# Patient Record
Sex: FEMALE | Race: WHITE | Employment: OTHER | URBAN - METROPOLITAN AREA
[De-identification: names, ages, dates, MRNs, and addresses within clinical notes are randomized per-mention and may not be internally consistent; named-entity substitution may affect disease eponyms.]

---

## 2018-01-24 ENCOUNTER — HOSPITAL ENCOUNTER (OUTPATIENT)
Dept: CT IMAGING | Age: 58
Discharge: HOME | End: 2018-01-24
Attending: INTERNAL MEDICINE
Payer: COMMERCIAL

## 2018-01-24 DIAGNOSIS — R22.0 JAW SWELLING: ICD-10-CM

## 2018-01-24 LAB — CREAT UR-MCNC: 0.9 MG/DL (ref 0.6–1.3)

## 2018-01-24 PROCEDURE — 82565 ASSAY OF CREATININE: CPT

## 2018-01-24 PROCEDURE — 74011636320 HC RX REV CODE- 636/320

## 2018-01-24 PROCEDURE — 70491 CT SOFT TISSUE NECK W/DYE: CPT

## 2018-01-24 RX ADMIN — IOPAMIDOL 100 ML: 612 INJECTION, SOLUTION INTRAVENOUS at 15:00

## 2018-02-12 ENCOUNTER — HOSPITAL ENCOUNTER (OUTPATIENT)
Dept: MAMMOGRAPHY | Age: 58
Discharge: HOME | End: 2018-02-12
Attending: INTERNAL MEDICINE
Payer: COMMERCIAL

## 2018-02-12 DIAGNOSIS — Z12.31 VISIT FOR SCREENING MAMMOGRAM: ICD-10-CM

## 2018-02-12 PROCEDURE — 77067 SCR MAMMO BI INCL CAD: CPT

## 2018-08-10 ENCOUNTER — NURSE NAVIGATOR (OUTPATIENT)
Dept: OTHER | Age: 58
End: 2018-08-10

## 2018-08-10 NOTE — NURSE NAVIGATOR
Referring Provider: Misty Wary MD      Lung Cancer Risk Profile:   Age: 60  Gender: Female  Height: 66\"  Weight: 262#    Smoking History:  Smoking Status: current use  # years smokin  # years quit: 0  Packs/day: 1  Pack years: 37    Patient discussed smoking cessation with PCP: Yes, per patient report    Patient participated in shared decision making process with PCP: Unknown    Patient is currently experiencing symptoms: No, per patient report    If yes what symptoms:     Co-Morbidities:      Cancer History:      Additional Risk Factors:    Exposure to diesel fumes      Patient's smoking history discussed via phone. Patient meets LDCT lung cancer screening criteria.  Call transferred to central scheduling to schedule exam.      Santino Solis, KAELN, RN, [address] [facility] Nurse Navigator

## 2018-08-16 ENCOUNTER — HOSPITAL ENCOUNTER (OUTPATIENT)
Dept: CT IMAGING | Age: 58
Discharge: HOME | End: 2018-08-16
Attending: INTERNAL MEDICINE
Payer: COMMERCIAL

## 2018-08-16 VITALS — WEIGHT: 270 LBS | HEIGHT: 68 IN | BODY MASS INDEX: 40.92 KG/M2

## 2018-08-16 DIAGNOSIS — Z87.891 HISTORY OF TOBACCO USE: ICD-10-CM

## 2018-08-16 PROCEDURE — G0297 LDCT FOR LUNG CA SCREEN: HCPCS

## 2019-04-22 ENCOUNTER — HOSPITAL ENCOUNTER (OUTPATIENT)
Age: 59
Discharge: HOME | End: 2019-04-22
Attending: ORTHOPAEDIC SURGERY
Payer: COMMERCIAL

## 2019-04-22 DIAGNOSIS — M54.16 LUMBAR RADICULOPATHY: ICD-10-CM

## 2019-04-22 PROCEDURE — 72148 MRI LUMBAR SPINE W/O DYE: CPT

## 2020-01-08 ENCOUNTER — HOSPITAL ENCOUNTER (OUTPATIENT)
Dept: PHYSICAL THERAPY | Age: 60
Discharge: HOME | End: 2020-01-08
Payer: COMMERCIAL

## 2020-01-08 PROCEDURE — 97110 THERAPEUTIC EXERCISES: CPT

## 2020-01-08 PROCEDURE — 97162 PT EVAL MOD COMPLEX 30 MIN: CPT

## 2020-01-08 PROCEDURE — 97535 SELF CARE MNGMENT TRAINING: CPT

## 2020-01-08 NOTE — PROGRESS NOTES
[facility]  [address]  [place], [address]  [phone] fax    Plan of Care/ Statement of Necessity for Physical Therapy Services    Patient name: Moises Ordonez Start of Care: 2020   Referral source: Chaim Hankins MD : 1960    Medical Diagnosis: Unsteady gait [R26.81]  Low back pain [M54.5]  Payor: BLUE CROSS / Plan:  [facility] / Product Type: PPO /  Onset Date:2019    Treatment Diagnosis: LBP with B LE radiation   Prior Hospitalization: see medical history Provider#: [id number]   Medications: Verified on Patient summary List    Comorbidities: back pain, BMI over 30, high blood pressure, benign essential tremors with dystonia, incontinence (controlled)   Prior Level of Function: Independent prior to loss of function in 2019     The Plan of Care and following information is based on the information from the initial evaluation. Assessment/ key information: Pt is a 19-year-old woman with a referral for Aquatic therapy to address low back pain with radiculopathy and gait disturbances. She reports in early  experiencing back pain with radiation to her LE and saw a chiropractor for adjustments which helped for 2 weeks. In 2019, she reports suddenly being unable to walk and having numbness in her B LE, moreso on the left. She saw her doctor and received an MRI at that time. She has been in PT since 2019 with a recent discharge from [facility] last week. States that she received strain/conterstrain therapy to her neck, lumbar tractioning, chiropractics, and land-based therapy with good results. She had been w/c bound in April and is now Independent without an AD. Her c/c include low back pain with reference to her B LE, gait disturbances, and decreased standing and walking endurance.  She is restricted in her ADLs, including dishes and laundry, due to her lingering deficits and requires help from her . She is Independent with self care. She reports that since she started gabapentin, her neurological symptoms have decreased. She additionally has a history of benign essential tremors with dystonia, causing dystonic movements of her neck. Her walking endurance is currently limited to 5 minutes before she must sit down due to pain. Patient will continue to benefit from skilled PT services to modify and progress therapeutic interventions, address functional mobility deficits, address ROM deficits, address strength deficits, analyze and address soft tissue restrictions, analyze and cue movement patterns, analyze and modify body mechanics/ergonomics, assess and modify postural abnormalities, address imbalance/dizziness and instruct in home and community integration to attain remaining goals. Evaluation Complexity History HIGH Complexity :3+ comorbidities / personal factors will impact the outcome/ POC ; Examination HIGH Complexity : 4+ Standardized tests and measures addressing body structure, function, activity limitation and / or participation in recreation  ;Presentation MEDIUM Complexity : Evolving with changing characteristics  ; Clinical Decision Making MEDIUM Complexity : FOTO score of 26-74  Overall Complexity Rating: MEDIUM  Problem List: pain affecting function, decrease ROM, decrease strength, impaired gait/ balance, decrease ADL/ functional abilitiies, decrease activity tolerance and decrease flexibility/ joint mobility   Treatment Plan may include any combination of the following: Therapeutic exercise, Therapeutic activities, Neuromuscular re-education, Physical agent/modality, Gait/balance training, Manual therapy, Aquatic therapy, Patient education, Self Care training and Functional mobility training  Patient / Family readiness to learn indicated by: asking questions and interest  Persons(s) to be included in education: patient (P)  Barriers to Learning/Limitations: None  Patient Goal (s): no pain  Patient Self Reported Health Status: good  Rehabilitation Potential: good    Short Term Goals: To be accomplished in 2 weeks:   1. Pt will report compliance with her HEP 1-2 times daily to maximize therapeutic benefit. 2. Pt will complete 30-45 minute aquatic therapy session without increase in fatigue to improve ability to perform ADLs. Long Term Goals: To be accomplished in 6 weeks:   1. Pt will improve FOTO score to 48, demonstrating improved functional outcomes. 2. Pt will hold an abdominal crunch for at least 15 seconds, demonstrating improved core strength for activity endurance. 3. Pt will improve hip flexion and extension strength to 4/5 or better to improve walking endurance and capacity to perform ADLs. 4. Pt will walk for 10 minutes on the treadmill set to 0.8mph or greater, demonstrating improved ambulation tolerance. 5. Pt will indicate readiness to transition to land-based therapy upon reports of improved endurance with standing and ambulation for at least 10 minutes at home. 6. Pt will report decreased pain to 2/10 or better without lower extremity reference to improve QOL. Frequency / Duration: Patient to be seen 2 times per week for 6 weeks. Patient/ Caregiver education and instruction: Diagnosis, prognosis, self care, activity modification and exercises   [x]  Plan of care has been reviewed with GEORGETTE Becerra, PT 1/8/2020 1:46 PM    ________________________________________________________________________    I certify that the above Therapy Services are being furnished while the patient is under my care. I agree with the treatment plan and certify that this therapy is necessary.     [de-identified] Signature:____________Date:_________TIME:________    [employer], Date and Time must be completed for valid certification **    Please sign and return to [address] [address] [address]  [place], [place] [address] [phone] fax

## 2020-01-08 NOTE — PROGRESS NOTES
PT DAILY TREATMENT NOTE/LUMBAR EVAL 10-18    Patient Name: James Ahmadi  Date:2020  : 1960  [x]  Patient  Verified  Payor: BLUE CROSS / Plan: [employer] [facility] / Product Type: PPO /    In time:1151  Out time:109  Total Treatment Time (min): 78  Visit #: 1 of 12    Medicare/BCBS Only   Total Timed Codes (min):  45 1:1 Treatment Time:  78     Treatment Area: Unsteady gait [R26.81]  Low back pain [M54.5]  SUBJECTIVE  Pain Level (0-10 scale): 2  []constant [x]intermittent []improving []worsening []no change since onset    Any medication changes, allergies to medications, adverse drug reactions, diagnosis change, or new procedure performed?: [x] No    [] Yes (see summary sheet for update)  Subjective functional status/changes:     PLOF: In 2019, pt lost ability to walk due to consequences of lumbar radiculopathy and has been in PT since then. In April she was w/c dependent and has progressed back to no AD. Prior to April, she was independent  Limitations to PLOF: radiculopathy, pain  Mechanism of Injury: no injury  Current symptoms/Complaints: low back pain, B LE radiculopathy typically to posterior thighs and knees but occasionally to feet. Symptoms increase with prolonged standing and walking. Previous Treatment/Compliance: Pt received PT and chiropractics at [facility] where she progressed well with therapy  PMHx/Surgical Hx: neurological disease present (benign essential tremor with dystonia) affecting her head and neck  Work Hx: retired  Living Situation: lives in a single story home with her  with a couple steps to enter and 1-2 steps in the home to get to the living room and other areas of the home. Pt reports ability to do steps without assistance.   Pt Goals: walk better  Barriers: [x]pain []financial []time []transportation []other  Motivation: pt appears motivated and hopeful to keep progressing  Substance use: []Alcohol [x]Tobacco []other: OBJECTIVE/EXAMINATION  Domestic Life: lives with   Activity/Recreational Limitations: unable to do dishes, laundry, or house work due to pain and radicular symptoms. Cannot tolerate walking at stores without requiring rest following 5 minutes. Mobility: No device, endurance limited  Self Care: Independent, utilizes a shower chair due to limited standing tolerance    45 min [x]Eval                  []Re-Eval        20 min Therapeutic Exercise:  [] See flow sheet : instructed in HEP, provided manual stretching to piriformis B   Rationale: increase ROM and increase strength to improve the patients ability to perform self care and ADLs with improved ease and decreased pain. 13 min Self Care/Home Management:  []  See flow sheet : Instructed pt regarding rehab process and importance of doing HEP at home to supplement exercise. Rationale: increase ROM and increase strength  to improve the patients ability to perform ADLs and recreational activities. With   [x] TE   [] TA   [] neuro   [] other: Patient Education: [x] Review HEP    [] Progressed/Changed HEP based on:   [] positioning   [] body mechanics   [] transfers   [] heat/ice application    [] other:        Physical Therapy Evaluation - Lumbar Spine ([facility])    SUBJECTIVE  Chief Complaint: LBP, limited standing and walking endurance, wants to walk better    Mechanism of injury: neurological -- no injury    Symptoms:  Aggravated by:   [x] Bending [] Sitting [x] Standing [x] Walking   [x] Moving [] Cough [] Sneeze [] Valsalva   [] AM  [] PM  Lying:  [] sup   [] pro   [] sidelying   [] Other:     Eased by:    [] Bending [x] Sitting [] Standing [] Walking   [] Moving [] AM  [] PM  Lying: [] sup  [] pro  [] sidelying   [] Other:     General Health:  Red Flags Indicated?  [] Yes    [x] No  Pt asked to clarify incontinence she marked on the FOTO, in which she remarked that it is \"occasional urine leakage when I have to go to the bathroom due to my age. \" She states that this is not frequent and she controls it by going to the bathroom first.    Past History/Treatments: Received PT at [facility]    Diagnostic Tests: [] Lab work [] X-rays    [] CT [x] MRI     [] Other:  Results: Pt states results were L5-S2 segment compression    Functional Status  Prior level of function: Prior to 2019, Independent  Present functional limitations: Limited ADL capacity, limited ambulation and standing endurance    OBJECTIVE  Posture:  Lateral Shift: [] R    [] L     [] +  [] -  Kyphosis: [] Increased [] Decreased   []  WNL  Lordosis:  [] Increased [x] Decreased   [] WNL  Pelvic symmetry: [x] WNL    [] Other:    Gait:  [] Normal     [x] Abnormal: slow caryn, short step length, minimal foot strike bilaterally with the right foot contacting foot flat, minimal arm swing    Active Movements: [] N/A   [] Too acute   [] Other:  ROM % AROM % PROM Comments:pain, area   Forward flexion 40-60 75%   pain in low back   Extension 20-30 50%     SB right 20-30 50%     SB left 20-30 50%     Rotation right 5-10 50%     Rotation left 5-10 50%         Neuro Screen [x] WNL  Myotome/Dermatome  Comments: light touch sensation in tact, muscular weakness indicative of reduced activity as part of recovery    Dural Mobility:  SLR Sitting: [] R    [] L    [] +    [] -  @ (degrees):           Supine: [x] R    [x] L    [x] +    [] -  @ (degrees):  approx 60 degrees hip flexion B  Slump Test: [] R    [] L    [] +    [] -  @ (degrees):   Prone Knee Bend: [x] R    [x] L    [x] +    [] -  Positive with about 10 inches between heel and buttocks    Palpation  [] Min  [x] Mod  [] Severe    Location: L5 P-A mobilization pressure yields pain    Strength   L(0-5) R (0-5) N/T   Hip Flexion (L1,2) 3+ 3+ []   Knee Extension (L3,4) 4+ 4+ []   Ankle Dorsiflexion (L4) 4+ 4+ []   Great Toe Extension (L5)   [x]   Ankle Plantarflexion (S1) 4+ 4+ []   Knee Flexion (S1,2) 4+ 4+ []   Hip ABD/ADD 5 5 []   Hip IR/ER 4- 4- [] Paraspinals   [x]   Back Rotators   [x]   Gluteus Osmin 3+ 4- []   Other   []     Special Tests    [place]:  Gaenslen's: [] R    [] L    [] +    [] -     Compression: [] +    [x] -     Gapping:  [] +    [x] -     Thigh Thrust: [x] R    [x] L    [] +    [x] -     Leg Length: [] +    [x] -   Position:            Hip: Lito Mackintosh:  [x] R    [x] L    [x] +    [] -     Scour:  [x] R    [x] L    [] +    [x] -     Piriformis: [x] R    [x] L    [x] +    [] -          Deficits: Randall's: [] R    [] L    [] +    [] -     Tanner Fergusson: [] R    [] L    [] +    [] -     Hamstrings 90/90: limited flexibility to approx 60 degrees hip flex with neural tensioning       Global Muscular Weakness:  Abdominals: mini crunch endurance of 10 seconds  Glute Bridge endurance: 10 second hold      Other tests/comments:   Normal Stance EO/EC: 30 seconds   NBOS EO/EC: 30 seconds with additional postural sway due to benign essential tremors with dystonia    Pain Level (0-10 scale) post treatment: 2    ASSESSMENT/Changes in Function: Pt is a 79-year-old woman with a referral for Aquatic therapy to address low back pain with radiculopathy and gait disturbances. She reports in early 2019 experiencing back pain with radiation to her LE and saw a chiropractor for adjustments which helped for 2 weeks. In April of 2019, she reports suddenly being unable to walk and having numbness in her B LE, moreso on the left. She saw her doctor and received an MRI at that time. She has been in PT since April of 2019 with a recent discharge from [facility] last week. States that she received strain/conterstrain therapy to her neck, lumbar tractioning, chiropractics, and land-based therapy with good results. She had been w/c bound in April and is now Independent without an AD. Her c/c include low back pain with reference to her B LE, gait disturbances, and decreased standing and walking endurance.  She is restricted in her ADLs, including dishes and laundry, due to her lingering deficits and requires help from her . She is Independent with self care. She reports that since she started gabapentin, her neurological symptoms have decreased. She additionally has a history of benign essential tremors with dystonia, causing dystonic movements of her neck. Her walking endurance is currently limited to 5 minutes before she must sit down due to pain. Patient will continue to benefit from skilled PT services to modify and progress therapeutic interventions, address functional mobility deficits, address ROM deficits, address strength deficits, analyze and address soft tissue restrictions, analyze and cue movement patterns, analyze and modify body mechanics/ergonomics, assess and modify postural abnormalities, address imbalance/dizziness and instruct in home and community integration to attain remaining goals. [x]  See Plan of Care  []  See progress note/recertification  []  See Discharge Summary         Progress towards goals / Updated goals:  Short Term Goals: To be accomplished in 2 weeks:   1. Pt will report compliance with her HEP 1-2 times daily to maximize therapeutic benefit. IE: HEP assigned   2. Pt will complete 30-45 minute aquatic therapy session without increase in fatigue to improve ability to perform ADLs. IE: Pt reports fatigue following 5 minutes of standing/walking  Long Term Goals: To be accomplished in 6 weeks:   1. Pt will improve FOTO score to 48, demonstrating improved functional outcomes. IE: 39   2. Pt will hold an abdominal crunch for at least 15 seconds, demonstrating improved core strength for activity endurance. IE: 10 seconds with abdominal tremor   3. Pt will improve hip flexion and extension strength to 4/5 or better to improve walking endurance and capacity to perform ADLs. IE: hip flexion MMT B 3+/5, right hip extension MMT 4-/5 and left 3+/5   4.  Pt will walk for 10 minutes on the treadmill set to 0.8mph or greater, demonstrating improved ambulation tolerance. IE: d/c from previous facility, ambulates 5 minutes at 0.8mph before needs to rest   5. Pt will indicate readiness to transition to land-based therapy upon reports of improved endurance with standing and ambulation for at least 10 minutes at home. IE: back pain with radiculopathy limits land therapy progression   6. Pt will report decreased pain to 2/10 or better without lower extremity reference to improve QOL.    IE: pain ranges from 1-10/10      PLAN  []  Upgrade activities as tolerated     [x]  Continue plan of care  []  Update interventions per flow sheet       []  Discharge due to:_  []  Other:_      Raissa Betts, PT 1/8/2020  1:10 PM

## 2020-01-13 ENCOUNTER — HOSPITAL ENCOUNTER (OUTPATIENT)
Dept: PHYSICAL THERAPY | Age: 60
Discharge: HOME | End: 2020-01-13
Payer: COMMERCIAL

## 2020-01-13 PROCEDURE — 97113 AQUATIC THERAPY/EXERCISES: CPT

## 2020-01-13 NOTE — PROGRESS NOTES
PT DAILY TREATMENT NOTE 10-18    Patient Name: He Pierson  Date:2020  : 1960  [x]  Patient  Verified  Payor: BLUE CROSS / Plan: [employer] [facility] [place] / Product Type: PPO /    In time:515  Out time:609  Total Treatment Time (min): 54  Visit #: 2 of 12    Medicare/BCBS Only   Total Timed Codes (min):  54 1:1 Treatment Time:  54       Treatment Area: Unsteady gait [R26.81]  Low back pain [M54.5]    SUBJECTIVE  Pain Level (0-10 scale): 2  Any medication changes, allergies to medications, adverse drug reactions, diagnosis change, or new procedure performed?: [x] No    [] Yes (see summary sheet for update)  Subjective functional status/changes:   [] No changes reported  Pt reports exercises at home are going well. No c/o today. OBJECTIVE    54 min Therapeutic Exercise:  [x] See flow sheet : aquatic therapy   Rationale: increase ROM, increase strength and improve balance to improve the patients ability to perform self care with decreased pain and improved ease. With   [x] TE   [] TA   [] neuro   [] other: Patient Education: [x] Review HEP    [] Progressed/Changed HEP based on:   [] positioning   [] body mechanics   [] transfers   [] heat/ice application    [] other:      Pain Level (0-10 scale) post treatment: 3-4 back pain with fatigue, but \"feels good\"    ASSESSMENT/Changes in Function: Pt performs all exercises as directed. Utilized treadmill a second time at the conclusion of therapy to work towards her goal of continued endurance for gait outside of therapy.  Endurance is significantly improved in the pool as noted by pt and therapist.    Patient will continue to benefit from skilled PT services to modify and progress therapeutic interventions, address functional mobility deficits, address ROM deficits, address strength deficits, analyze and address soft tissue restrictions, analyze and cue movement patterns, analyze and modify body mechanics/ergonomics, assess and modify postural abnormalities, address imbalance/dizziness and instruct in home and community integration to attain remaining goals. [x]  See Plan of Care  []  See progress note/recertification  []  See Discharge Summary         Progress towards goals / Updated goals:  Short Term Goals: To be accomplished in 2 weeks:              1. Pt will report compliance with her HEP 1-2 times daily to maximize therapeutic benefit. IE: HEP assigned   Current: met pt reports doing them daily and that the stretches have assisted to decrease back pain. (1/13/2020)              2. Pt will complete 30-45 minute aquatic therapy session without increase in fatigue to improve ability to perform ADLs. IE: Pt reports fatigue following 5 minutes of standing/walking   Current: progressing-- Pt tolerates 54 minutes of therapy in pool, with 10 minutes of treadmill walking; feels fatigue following therapy (1/13/2020)    Long Term Goals: To be accomplished in 6 weeks:              1. Pt will improve FOTO score to 48, demonstrating improved functional outcomes. IE: 39              2. Pt will hold an abdominal crunch for at least 15 seconds, demonstrating improved core strength for activity endurance. IE: 10 seconds with abdominal tremor              3. Pt will improve hip flexion and extension strength to 4/5 or better to improve walking endurance and capacity to perform ADLs. IE: hip flexion MMT B 3+/5, right hip extension MMT 4-/5 and left 3+/5              4. Pt will walk for 10 minutes on the treadmill set to 0.8mph or greater, demonstrating improved ambulation tolerance. IE: d/c from previous facility, ambulates 5 minutes at 0.8mph before needs to rest              5. Pt will indicate readiness to transition to land-based therapy upon reports of improved endurance with standing and ambulation for at least 10 minutes at home.               IE: back pain with radiculopathy limits land therapy progression              6. Pt will report decreased pain to 2/10 or better without lower extremity reference to improve QOL.               IE: pain ranges from 1-10/10    PLAN  [x]  Upgrade activities as tolerated     [x]  Continue plan of care  []  Update interventions per flow sheet       []  Discharge due to:_  []  Other:_      Patricia Mendoza, PT 1/13/2020  5:25 PM    Future Appointments   Date Time Provider [place]on [place]   1/15/2020  5:15 PM Rangel Estrellita, PT [facility]   1/20/2020  5:15 PM Rangel Estrellita, PT [facility]   1/22/2020  5:15 PM Rangel Estrellita, PT [facility]   1/27/2020  5:15 PM Rangel Estrellita, PT [facility]   1/29/2020  5:15 PM Rangel Estrellita, PT [facility]   2/3/2020  3:45 PM Rangel Estrellita, PT [facility]   2/6/2020  3:45 PM Jenelle Douglas, PTA [facility]   2/11/2020  3:45 PM Jenelle Douglas, PTA [facility]   2/13/2020  3:45 PM Jenelle Douglas, PTA [facility]   2/18/2020  3:45 PM Jenelle Douglas, PTA [facility]   2/20/2020  3:45 PM Jenelle Douglas, PTA [facility]

## 2020-01-15 ENCOUNTER — HOSPITAL ENCOUNTER (OUTPATIENT)
Dept: PHYSICAL THERAPY | Age: 60
Discharge: HOME | End: 2020-01-15
Payer: COMMERCIAL

## 2020-01-15 PROCEDURE — 97113 AQUATIC THERAPY/EXERCISES: CPT

## 2020-01-15 NOTE — PROGRESS NOTES
PT DAILY TREATMENT NOTE 10-18    Patient Name: Jude Maxwell  Date:1/15/2020  : 1960  [x]  Patient  Verified  Payor: BLUE CROSS / Plan: [employer] [facility] / Product Type: PPO /    In time:518  Out time:601  Total Treatment Time (min): 43  Visit #: 3 of 12    Treatment Area: Unsteady gait [R26.81]  Low back pain [M54.5]    SUBJECTIVE  Pain Level (0-10 scale): 4  Any medication changes, allergies to medications, adverse drug reactions, diagnosis change, or new procedure performed?: [x] No    [] Yes (see summary sheet for update)  Subjective functional status/changes:   [] No changes reported  Pt reports LBP soreness and right LE soreness yesterday that has decreased some today. She also reports some nerve-related pain that would intermittently appear throughout the day. Today she is feeling better. OBJECTIVE    43 min Therapeutic Exercise:  [x] See flow sheet : aquatic therapy exercises initiated per flow sheet   Rationale: increase ROM, increase strength and improve balance to improve the patients ability to perform ADLs and self care with improved ease and decreased pain. With   [x] TE   [] TA   [] neuro   [] other: Patient Education: [x] Review HEP    [] Progressed/Changed HEP based on:   [] positioning   [] body mechanics   [] transfers   [] heat/ice application    [] other:      Pain Level (0-10 scale) post treatment: 3 in back, right hip pain \"better\"    ASSESSMENT/Changes in Function: Pt performs all exercises as directed with minimal cueing. Requires close supervision on the steps coming out of the pool today but negotiates them safely.     Patient will continue to benefit from skilled PT services to modify and progress therapeutic interventions, address functional mobility deficits, address ROM deficits, address strength deficits, analyze and address soft tissue restrictions, analyze and cue movement patterns, analyze and modify body mechanics/ergonomics, assess and modify postural abnormalities and instruct in home and community integration to attain remaining goals. [x]  See Plan of Care  []  See progress note/recertification  []  See Discharge Summary         Progress towards goals / Updated goals:  Short Term Goals: To be accomplished in 2 weeks:              5. Pt will report compliance with her HEP 1-2 times daily to maximize therapeutic benefit.              IE: HEP assigned              Current: met pt reports doing them daily and that the stretches have assisted to decrease back pain. (1/13/2020)                2. Pt will complete 30-45 minute aquatic therapy session without increase in fatigue to improve ability to perform ADLs.             XJ: Pt reports fatigue following 5 minutes of standing/walking              Current: progressing-- Pt tolerates 54 minutes of therapy in pool, with 10 minutes of treadmill walking; feels fatigue following therapy (1/13/2020)     Long Term Goals: To be accomplished in 6 weeks:              1. Pt will improve FOTO score to 48, demonstrating improved functional outcomes.             BZ: 21              4. Pt will hold an abdominal crunch for at least 15 seconds, demonstrating improved core strength for activity endurance.              IE: 10 seconds with abdominal tremor              3. Pt will improve hip flexion and extension strength to 4/5 or better to improve walking endurance and capacity to perform ADLs.             UJ: hip flexion MMT B 3+/5, right hip extension MMT 4-/5 and left 3+/5              4. Pt will walk for 10 minutes on the treadmill set to 0.8mph or greater, demonstrating improved ambulation tolerance.              IE: d/c from previous facility, ambulates 5 minutes at 0.8mph before needs to rest              5.  Pt will indicate readiness to transition to land-based therapy upon reports of improved endurance with standing and ambulation for at least 10 minutes at home.              IE: back pain with radiculopathy limits land therapy progression              6. Pt will report decreased pain to 2/10 or better without lower extremity reference to improve QOL.               LQ: pain ranges from 1-10/10    PLAN  []  Upgrade activities as tolerated     [x]  Continue plan of care  []  Update interventions per flow sheet       []  Discharge due to:_  []  Other:_      Sherrell Andrew, PT 1/15/2020  5:34 PM    Future Appointments   Date Time Provider Travon [place]   1/20/2020  5:15 PM Renu Kohut, PT [facility]   1/22/2020  5:15 PM Renu Kohut, PT [facility]   1/27/2020  5:15 PM Renu Kohut, PT [facility]   1/29/2020  5:15 PM Renu Kohut, PT [facility]   2/3/2020  3:45 PM Renu Kohut, PT [facility]   2/6/2020  3:45 PM [employer], PTA [facility]   2/11/2020  3:45 PM [employer], PTA [facility]   2/13/2020  3:45 PM [employer], PTA [facility]   2/18/2020  3:45 PM [employer], PTA [facility]   2/20/2020  3:45 PM [employer], PTA [facility]

## 2020-01-20 ENCOUNTER — APPOINTMENT (OUTPATIENT)
Dept: PHYSICAL THERAPY | Age: 60
End: 2020-01-20
Payer: COMMERCIAL

## 2020-01-22 ENCOUNTER — APPOINTMENT (OUTPATIENT)
Dept: PHYSICAL THERAPY | Age: 60
End: 2020-01-22
Payer: COMMERCIAL

## 2020-01-24 ENCOUNTER — NURSE NAVIGATOR (OUTPATIENT)
Dept: OTHER | Age: 60
End: 2020-01-24

## 2020-01-24 NOTE — NURSE NAVIGATOR
Referring Provider: Bella Goddard MD      Lung Cancer Risk Profile:   Age: 60  Gender: Female  Height: 66\"  Weight: 280#    Smoking History:  Smoking Status: current use  # years smokin  # years quit: 0  Packs/day: 1  Pack years: 40    Patient discussed smoking cessation with PCP: Yes, per provider note    Patient participated in shared decision making process with PCP: Unknown    Patient is currently experiencing symptoms: No, per patient report    If yes what symptoms:     Co-Morbidities:      Cancer History:      Additional Risk Factors:       Exposure to diesel fumes      Patient's smoking history discussed via phone. Patient meets LDCT lung cancer screening criteria.  Call transferred to central scheduling to schedule exam.      KAEL GoncalvesN, RN, [address] [address] [facility] Nurse Navigator

## 2020-01-27 ENCOUNTER — HOSPITAL ENCOUNTER (OUTPATIENT)
Dept: PHYSICAL THERAPY | Age: 60
Discharge: HOME | End: 2020-01-27
Payer: COMMERCIAL

## 2020-01-27 PROCEDURE — 97113 AQUATIC THERAPY/EXERCISES: CPT

## 2020-01-27 NOTE — PROGRESS NOTES
PT DAILY TREATMENT NOTE 10-18    Patient Name: Roya Arevalo  Date:2020  : 1960  [x]  Patient  Verified  Payor: BLUE CROSS / Plan: Chloe Jimenez / Product Type: PPO /    In time:512  Out time:600  Total Treatment Time (min): 48  Visit #: 4 of 12    Medicare/BCBS Only   Total Timed Codes (min):  48 1:1 Treatment Time:  48       Treatment Area: Unsteady gait [R26.81]  Low back pain [M54.5]    SUBJECTIVE  Pain Level (0-10 scale): 2  Any medication changes, allergies to medications, adverse drug reactions, diagnosis change, or new procedure performed?: [x] No    [] Yes (see summary sheet for update)  Subjective functional status/changes:   [] No changes reported  Pt reports pain is not bad today. States she had to cancel last week's appointments due to having the flu. Reports she has been trying to walk daily whether that is to her father's grave or around her house. OBJECTIVE    48 min Therapeutic Exercise:  [x] See flow sheet : Aquatic therapy initiated per flowsheet   Rationale: increase ROM and increase strength to improve the patients ability to perform ADLs and ambulatory tasks with improved ease and decreased pain. With   [x] TE   [] TA   [] neuro   [] other: Patient Education: [x] Review HEP    [] Progressed/Changed HEP based on:   [] positioning   [] body mechanics   [] transfers   [] heat/ice application    [] other:      Pain Level (0-10 scale) post treatment: 1    ASSESSMENT/Changes in Function: Pt performs exercises as directed, requiring verbal cues for correct form. Activity endurance continues to have limitations due to fatigue and onset of increasing back pain but has improved since her [facility] for 5 minutes of treadmill walking to 7 minutes. Pt educated to continue with HEP now that she is no longer sick.     Patient will continue to benefit from skilled PT services to modify and progress therapeutic interventions, address functional mobility deficits, address ROM deficits, address strength deficits, analyze and address soft tissue restrictions, analyze and cue movement patterns, analyze and modify body mechanics/ergonomics, assess and modify postural abnormalities, address imbalance/dizziness and instruct in home and community integration to attain remaining goals. [x]  See Plan of Care  []  See progress note/recertification  []  See Discharge Summary         Progress towards goals / Updated goals:  Short Term Goals: To be accomplished in 2 weeks:              2. Pt will report compliance with her HEP 1-2 times daily to maximize therapeutic benefit.              IE: HEP assigned              Current: met pt reports doing them daily and that the stretches have assisted to decrease back pain. (1/13/2020)                 2. Pt will complete 30-45 minute aquatic therapy session without increase in fatigue to improve ability to perform ADLs.             TV: Pt reports fatigue following 5 minutes of standing/walking              ZQCUAXY: met-- Pt tolerates 45 minute therapy session without increase in fatigue (1/27/2020)     Long Term Goals: To be accomplished in 6 weeks:              1. Pt will improve FOTO score to 48, demonstrating improved functional outcomes.             HX: 09              1. Pt will hold an abdominal crunch for at least 15 seconds, demonstrating improved core strength for activity endurance.              IE: 10 seconds with abdominal tremor              3. Pt will improve hip flexion and extension strength to 4/5 or better to improve walking endurance and capacity to perform ADLs.               PA: hip flexion MMT B 3+/5, right hip extension MMT 4-/5 and left 3+/5              4. Pt will walk for 10 minutes on the treadmill set to 0.8mph or greater, demonstrating improved ambulation tolerance.              IE: d/c from previous facility, ambulates 5 minutes at 0.8mph before needs to rest   Current: progressing -- pt ambulates on treadmill at [address] for 7 minutes before back pain requires her to rest (1/27/2020)              5. Pt will indicate readiness to transition to land-based therapy upon reports of improved endurance with standing and ambulation for at least 10 minutes at home.              IE: back pain with radiculopathy limits land therapy progression              6. Pt will report decreased pain to 2/10 or better without lower extremity reference to improve QOL.               WZ: pain ranges from 1-10/10   Current: progressing -- pt reports 2/10 pain with right LE reference (1/27/2020)    PLAN  []  Upgrade activities as tolerated     [x]  Continue plan of care  []  Update interventions per flow sheet       []  Discharge due to:_  []  Other:_      Janes Seen, PT 1/27/2020  5:15 PM    Future Appointments   Date Time Provider Travon [place]   1/29/2020  5:15 PM Rashi Specking, PT [facility]   1/30/2020 10:30 AM HBV CT RM 1 [facility]   2/3/2020  3:45 PM Rashi Specking, PT [facility]   2/6/2020  3:45 PM Christiano Hunting, PTA [facility]   2/11/2020  3:45 PM Christiano Hunting, PTA [facility]   2/13/2020  3:45 PM Christiano Hunting, PTA [facility]   2/18/2020  3:45 PM Christiano Hunting, PTA [facility]   2/20/2020  3:45 PM Christiano Hunting, PTA [facility]

## 2020-01-29 ENCOUNTER — HOSPITAL ENCOUNTER (OUTPATIENT)
Dept: PHYSICAL THERAPY | Age: 60
Discharge: HOME | End: 2020-01-29
Payer: COMMERCIAL

## 2020-01-29 PROCEDURE — 97113 AQUATIC THERAPY/EXERCISES: CPT

## 2020-01-29 NOTE — PROGRESS NOTES
PT DAILY TREATMENT NOTE 10-18    Patient Name: Devang Jules  Date:2020  : 1960  [x]  Patient  Verified  Payor: BLUE CROSS / Plan: [employer] [facility] / Product Type: PPO /    In time:512  Out time:600  Total Treatment Time (min): 48  Visit #: 5 of 12    Medicare/BCBS Only   Total Timed Codes (min):  48 1:1 Treatment Time:  48       Treatment Area: Unsteady gait [R26.81]  Low back pain [M54.5]    SUBJECTIVE  Pain Level (0-10 scale): 4  Any medication changes, allergies to medications, adverse drug reactions, diagnosis change, or new procedure performed?: [x] No    [] Yes (see summary sheet for update)  Subjective functional status/changes:   [] No changes reported  Pt reports feeling pain and soreness following her HEP today in her right hip and low back with numbness in her right posterior and lateral hip/thigh. She asks if she may need another chiropractic adjustment. OBJECTIVE    48 min Therapeutic Exercise:  [x] See flow sheet :Aquatic Therapy initiated per flowsheet   Rationale: increase ROM and increase strength to improve the patients ability to perform ADLs and ambulatory tasks with improved ease and decreased pain. With   [] TE   [] TA   [] neuro   [] other: Patient Education: [x] Review HEP    [] Progressed/Changed HEP based on:   [] positioning   [] body mechanics   [] transfers   [] heat/ice application    [] other:       Pain Level (0-10 scale) post treatment: 2    ASSESSMENT/Changes in Function: Pt performs exercises as directed with good results of decreased LBP and hip pain. Informed pt about post-rehab program at the [facility], [facility] and gave her an information sheet. Additionally recommended joining [facility] independently during therapy to promote increased aquatics exercises to assist with progression towards goals.     Patient will continue to benefit from skilled PT services to modify and progress therapeutic interventions, address functional mobility deficits, address ROM deficits, address strength deficits, analyze and address soft tissue restrictions, analyze and cue movement patterns, analyze and modify body mechanics/ergonomics, assess and modify postural abnormalities, address imbalance/dizziness and instruct in home and community integration to attain remaining goals. [x]  See Plan of Care  []  See progress note/recertification  []  See Discharge Summary         Progress towards goals / Updated goals:  Short Term Goals: To be accomplished in 2 weeks:              9. Pt will report compliance with her HEP 1-2 times daily to maximize therapeutic benefit.              IE: HEP assigned              Current: met pt reports doing them daily and that the stretches have assisted to decrease back pain. (1/13/2020)                 2. Pt will complete 30-45 minute aquatic therapy session without increase in fatigue to improve ability to perform ADLs.             TK: Pt reports fatigue following 5 minutes of standing/walking              GZGPZAQ: met-- Pt tolerates 45 minute therapy session without increase in fatigue (1/27/2020)     Long Term Goals: To be accomplished in 6 weeks:              1. Pt will improve FOTO score to 48, demonstrating improved functional outcomes.             OI: 13              0. Pt will hold an abdominal crunch for at least 15 seconds, demonstrating improved core strength for activity endurance.              IE: 10 seconds with abdominal tremor              3. Pt will improve hip flexion and extension strength to 4/5 or better to improve walking endurance and capacity to perform ADLs.               YD: hip flexion MMT B 3+/5, right hip extension MMT 4-/5 and left 3+/5              4. Pt will walk for 10 minutes on the treadmill set to 0.8mph or greater, demonstrating improved ambulation tolerance.              IE: d/c from previous facility, ambulates 5 minutes at 0.8mph before needs to rest              Current: progressing -- pt ambulates on treadmill at 1mph for 7 minutes before back pain requires her to rest (1/27/2020)              5. Pt will indicate readiness to transition to land-based therapy upon reports of improved endurance with standing and ambulation for at least 10 minutes at home.              IE: back pain with radiculopathy limits land therapy progression              6. Pt will report decreased pain to 2/10 or better without lower extremity reference to improve QOL.               GL: pain ranges from 1-10/10              Current: progressing -- pt reports 2/10 pain with right LE reference (1/27/2020)    PLAN  [x]  Upgrade activities as tolerated     [x]  Continue plan of care  []  Update interventions per flow sheet       []  Discharge due to:_  []  Other:_      Brandi Whitfield, PT 1/29/2020  5:32 PM    Future Appointments   Date Time Provider Travon [place]   1/30/2020 10:30 AM HBV CT RM 1 [facility]   2/3/2020  3:45 PM Gale Brooks, PT [facility]   2/6/2020  3:45 PM Piotr Nine, PTA [facility]   2/11/2020  3:45 PM Piotr Nine, PTA [facility]   2/13/2020  3:45 PM Piotr Nine, PTA [facility]   2/18/2020  3:45 PM Piotr Nine, PTA [facility]   2/20/2020  3:45 PM Piotr Nine, PTA [facility]

## 2020-01-30 ENCOUNTER — HOSPITAL ENCOUNTER (OUTPATIENT)
Dept: CT IMAGING | Age: 60
Discharge: HOME | End: 2020-01-30
Attending: INTERNAL MEDICINE
Payer: COMMERCIAL

## 2020-01-30 VITALS — HEIGHT: 66 IN | WEIGHT: 275 LBS | BODY MASS INDEX: 44.2 KG/M2

## 2020-01-30 DIAGNOSIS — F17.200 CURRENT SMOKER: ICD-10-CM

## 2020-01-30 PROCEDURE — G0297 LDCT FOR LUNG CA SCREEN: HCPCS

## 2020-02-03 ENCOUNTER — HOSPITAL ENCOUNTER (OUTPATIENT)
Dept: PHYSICAL THERAPY | Age: 60
Discharge: HOME | End: 2020-02-03
Payer: COMMERCIAL

## 2020-02-03 PROCEDURE — 97113 AQUATIC THERAPY/EXERCISES: CPT

## 2020-02-03 NOTE — PROGRESS NOTES
[facility]  [address]  [place], [address]  [phone] fax    Physical Therapy Progress Note  Patient name: Ozzie Morales Start of Care: 2020   Referral source: Kirill Benson MD : 1960   Medical/Treatment Diagnosis: Unsteady gait [R26.81]  Low back pain [M54.5]  Payor: Fernando Castaneda / Plan: [address] [facility] / Product Type: PPO /  Onset Date:2020     Prior Hospitalization: see medical history Provider#: [id number]   Medications: Verified on Patient Summary List    Comorbidities: back pain, BMI over 30, high blood pressure, benign essential tremors with dystonia, incontinence (controlled)  Prior Level of Function:Independent prior to loss of function in 2019  Visits from Start of Care: 6    Missed Visits: 2      Established Goals:          Short Term Goals: To be accomplished in 2 weeks:  1. Pt will report compliance with her HEP 1-2 times daily to maximize therapeutic benefit.              IE: HEP assigned              Current: met pt reports doing them daily and that the stretches have assisted to decrease back pain. 2. Pt will complete 30-45 minute aquatic therapy session without increase in fatigue to improve ability to perform ADLs.             SJ: Pt reports fatigue following 5 minutes of standing/walking              Current: met-- Pt tolerates 45 minute therapy session without increase in fatigue   Long Term Goals: To be accomplished in 6 weeks:  1. Pt will improve FOTO score to 48, demonstrating improved functional outcomes.             PU: 41   Current: progressing -- 44  2. Pt will hold an abdominal crunch for at least 15 seconds, demonstrating improved core strength for activity endurance.              IE: 10 seconds with abdominal tremor   Current: met -- pt holds crunch for 15 seconds with abdominal tremor  3.  Pt will improve hip flexion and extension strength to 4/5 or better to improve walking endurance and capacity to perform ADLs.             XF: hip flexion MMT B 3+/5, right hip extension MMT 4-/5 and left 3+/5   Current: met -- MMT hip flexion right 4/5 left 4+/5, hip extension right 4/5 left 5/5  4. Pt will walk for 10 minutes on the treadmill set to 0.8mph or greater, demonstrating improved ambulation tolerance.              IE: d/c from previous facility, ambulates 5 minutes at 0.8mph before needs to rest              Current: progressing -- pt ambulates on treadmill at 1mph for 8 minutes before back pain requires her to rest   5. Pt will indicate readiness to transition to land-based therapy upon reports of improved endurance with standing and ambulation for at least 10 minutes at home.              IE: back pain with radiculopathy limits land therapy progression   Current: progressing -- pt reports improved endurance at home but will still need sit breaks after a few minutes of walking   6. Pt will report decreased pain to 2/10 or better without lower extremity reference to improve QOL.             JK: pain ranges from 1-10/10              Current: progressing -- pt reports 2/10 pain with right LE reference     Key Functional Changes: improved strength, flexibility, and activity endurance    Updated Goals: to be achieved in 5 weeks:  1. Pt will improve FOTO score to 48, demonstrating improved functional outcomes. PN: progressing -- 44 (2/3/2020)  2. Pt will improve hip flexion and extension strength to 4+/5 or better to improve walking endurance and capacity to perform ADLs. PN: progressing -- MMT hip flexion right 4/5 left 4+/5, hip extension right 4/5 left 5/5 (2/3/2020)  3. Pt will walk for 10 minutes on the treadmill set to 0.8mph or greater, demonstrating improved ambulation tolerance.              PN: progressing -- pt ambulates on treadmill at 1mph for 8 minutes before back pain requires her to rest (2/3/2020)  4.  Pt will indicate readiness to transition to land-based therapy upon reports of improved endurance with standing and ambulation for at least 10 minutes at home. PN: progressing -- pt reports improved endurance at home but will still need sit breaks after a few minutes of walking (2/3/2020)  5. Pt will report decreased pain to 2/10 or better without lower extremity reference to improve QOL.             SA: progressing -- pt reports 2/10 pain with right LE reference (2/3/2020)      ASSESSMENT/RECOMMENDATIONS:  Pt performs exercises without increase in pain today. Pt has been progressively tolerating increased time on the treadmill, now able to ambulate for 8 minutes at 1mph before nerve-related right hip pain restricts her. Exercises continue to be challenging but she is able to complete 30-60 minutes of exercises per session. A laminated aquatic HEP has been provided for her to utilize in the future whenever she decides to transition to a post-rehab program at the [facility]. Patient will continue to benefit from skilled PT services to modify and progress therapeutic interventions, address functional mobility deficits, address ROM deficits, address strength deficits, analyze and address soft tissue restrictions, analyze and cue movement patterns, analyze and modify body mechanics/ergonomics, assess and modify postural abnormalities, address imbalance/dizziness and instruct in home and community integration to attain remaining goals.     [x]Continue therapy per initial plan/protocol at a frequency of  2 x per week for 5 weeks  []Continue therapy with the following recommended changes:_____________________      _____________________________________________________________________  []Discontinue therapy progressing towards or have reached established goals  []Discontinue therapy due to lack of appreciable progress towards goals  []Discontinue therapy due to lack of attendance or compliance  []Await Physician's recommendations/decisions regarding therapy  []Other:________________________________________________________________    Thank you for this referral.    Raissa Betts, PT 2/3/2020 3:30 PM  NOTE TO PHYSICIAN:  PLEASE COMPLETE THE ORDERS BELOW AND   FAX TO [facility]: [phone]  If you are unable to process this request in 24 hours please contact our office: [phone]    ? I have read the above report and request that my patient continue as recommended. ? I have read the above report and request that my patient continue therapy with the following changes/special instructions:__________________________________________________________  ? I have read the above report and request that my patient be discharged from therapy.     Physicians signature: ______________________________Date: ______Time:______

## 2020-02-03 NOTE — PROGRESS NOTES
PT DAILY TREATMENT NOTE 10-18    Patient Name: Dru Estrada  Date:2/3/2020  : 1960  [x]  Patient  Verified  Payor: BLUE CROSS / [facility] [place] / Product Type: PPO /    In time:343  Out time:436  Total Treatment Time (min): 53  Visit #: 6 of 12    Medicare/BCBS Only   Total Timed Codes (min):  53 1:1 Treatment Time:  53       Treatment Area: Unsteady gait [R26.81]  Low back pain [M54.5]    SUBJECTIVE  Pain Level (0-10 scale): 2-3 in back, 1-2 in right LE  Any medication changes, allergies to medications, adverse drug reactions, diagnosis change, or new procedure performed?: [x] No    [] Yes (see summary sheet for update)  Subjective functional status/changes:   [] No changes reported  Pt has good reports of decreased pain today. States she feels like she has greater energy following therapy sessions. Reports that she will see Dr Walker Silence assistant next Wednesday for a follow-up. OBJECTIVE    53 min Therapeutic Exercise:  [x] See flow sheet : Aquatic Therapy exercises initiated per flowsheet   Rationale: increase ROM and increase strength to improve the patients ability to perform self care and ADLs with improved ease and decreased pain. With   [x] TE   [] TA   [] neuro   [] other: Patient Education: [x] Review HEP    [] Progressed/Changed HEP based on:   [] positioning   [] body mechanics   [] transfers   [] heat/ice application    [] other:      Pain Level (0-10 scale) post treatment: 1-2 low back, 1 right leg    ASSESSMENT/Changes in Function: Pt performs exercises without increase in pain today. Pt has been progressively tolerating increased time on the treadmill, now able to ambulate for 8 minutes at 1mph before nerve-related right hip pain restricts her. Exercises continue to be challenging but she is able to complete 30-60 minutes of exercises per session.  A laminated aquatic HEP has been provided for her to utilize in the future whenever she decides to transition to a post-rehab program at the [facility]. Patient will continue to benefit from skilled PT services to modify and progress therapeutic interventions, address functional mobility deficits, address ROM deficits, address strength deficits, analyze and address soft tissue restrictions, analyze and cue movement patterns, analyze and modify body mechanics/ergonomics, assess and modify postural abnormalities, address imbalance/dizziness and instruct in home and community integration to attain remaining goals. []  See Plan of Care  [x]  See progress note/recertification  []  See Discharge Summary         Progress towards goals / Updated goals:  Short Term Goals: To be accomplished in 2 weeks:  1. Pt will report compliance with her HEP 1-2 times daily to maximize therapeutic benefit.              IE: HEP assigned              Current: met pt reports doing them daily and that the stretches have assisted to decrease back pain. 2. Pt will complete 30-45 minute aquatic therapy session without increase in fatigue to improve ability to perform ADLs.             GB: Pt reports fatigue following 5 minutes of standing/walking              Current: met-- Pt tolerates 45 minute therapy session without increase in fatigue   Long Term Goals: To be accomplished in 6 weeks:  1. Pt will improve FOTO score to 48, demonstrating improved functional outcomes.             RU: 41   Current: progressing -- 44  2. Pt will hold an abdominal crunch for at least 15 seconds, demonstrating improved core strength for activity endurance.              IE: 10 seconds with abdominal tremor   Current: met -- pt holds crunch for 15 seconds with abdominal tremor  3. Pt will improve hip flexion and extension strength to 4/5 or better to improve walking endurance and capacity to perform ADLs.               : hip flexion MMT B 3+/5, right hip extension MMT 4-/5 and left 3+/5   Current: met -- MMT hip flexion right 4/5 left 4+/5, hip extension right 4/5 left 5/5  4. Pt will walk for 10 minutes on the treadmill set to 0.8mph or greater, demonstrating improved ambulation tolerance.              IE: d/c from previous facility, ambulates 5 minutes at 0.8mph before needs to rest              Current: progressing -- pt ambulates on treadmill at 1mph for 8 minutes before back pain requires her to rest   5. Pt will indicate readiness to transition to land-based therapy upon reports of improved endurance with standing and ambulation for at least 10 minutes at home.              IE: back pain with radiculopathy limits land therapy progression   Current: progressing -- pt reports improved endurance at home but will still need sit breaks after a few minutes of walking   6. Pt will report decreased pain to 2/10 or better without lower extremity reference to improve QOL.               NB: pain ranges from 1-10/10              Current: progressing -- pt reports 2/10 pain with right LE reference   PLAN  [x]  Upgrade activities as tolerated     [x]  Continue plan of care  []  Update interventions per flow sheet       []  Discharge due to:_  []  Other:_      Brandi Whitfield, PT 2/3/2020  3:59 PM    Future Appointments   Date Time Provider [place]   2/6/2020  3:45 PM Piotr Nine, PTA [facility]   2/11/2020  3:45 PM Piotr Nine, PTA [facility]   2/13/2020  3:45 PM Piotr Nine, PTA [facility]   2/18/2020  3:45 PM Piotr Nine, PTA [facility]   2/20/2020  3:45 PM Piotr Nine, PTA [facility]

## 2020-02-06 ENCOUNTER — HOSPITAL ENCOUNTER (OUTPATIENT)
Dept: PHYSICAL THERAPY | Age: 60
Discharge: HOME | End: 2020-02-06
Payer: COMMERCIAL

## 2020-02-06 PROCEDURE — 97113 AQUATIC THERAPY/EXERCISES: CPT

## 2020-02-06 NOTE — PROGRESS NOTES
PT DAILY TREATMENT NOTE 10-18    Patient Name: Lambert Weber  Date:2020  : 1960  [x]  Patient  Verified  Payor: BLUE CROSS / Plan:  [facility] / Product Type: PPO /    In time:2:12  Out time:3:05  Total Treatment Time (min): 53  Visit #: 7 of 12    Medicare/BCBS Only   Total Timed Codes (min):  53 1:1 Treatment Time:  53       Treatment Area: Unsteady gait [R26.81]  Low back pain [M54.5]    SUBJECTIVE  Pain Level (0-10 scale): 3/10  Any medication changes, allergies to medications, adverse drug reactions, diagnosis change, or new procedure performed?: [x] No    [] Yes (see summary sheet for update)  Subjective functional status/changes:   [] No changes reported  Pt reports no new complaints of pain. Pt reports compliance with HEP. OBJECTIVE    53 min Therapeutic Exercise:  [x] See flow sheet :   Rationale: increase ROM and increase strength to improve the patients ability to tolerate ADLs. With   [] TE   [] TA   [] neuro   [] other: Patient Education: [x] Review HEP    [] Progressed/Changed HEP based on:   [] positioning   [] body mechanics   [] transfers   [] heat/ice application    [] other:      Other Objective/Functional Measures: minimal vc's for form with squats. Pain Level (0-10 scale) post treatment: 1/10    ASSESSMENT/Changes in Function: Pt has pain relief after performing aquatic exercises. Pt demonstrates improved endurance with ability to ambulate on TM in pool for 9 minutes at 1.1 MPH. Patient will continue to benefit from skilled PT services to modify and progress therapeutic interventions, address functional mobility deficits, address ROM deficits, address strength deficits, analyze and address soft tissue restrictions, analyze and cue movement patterns and analyze and modify body mechanics/ergonomics to attain remaining goals.      []  See Plan of Care  []  See progress note/recertification  []  See Discharge Summary         Progress towards goals / Updated goals:  Short Term Goals: To be accomplished in 2 weeks:  1. Pt will report compliance with her HEP 1-2 times daily to maximize therapeutic benefit.              IE: HEP assigned              Current: met pt reports doing them daily and that the stretches have assisted to decrease back pain. 2. Pt will complete 30-45 minute aquatic therapy session without increase in fatigue to improve ability to perform ADLs.             PZ: Pt reports fatigue following 5 minutes of standing/walking              Current: met-- Pt tolerates 45 minute therapy session without increase in fatigue   Long Term Goals: To be accomplished in 6 weeks:  1. Pt will improve FOTO score to 48, demonstrating improved functional outcomes.             RU: 41              Current: progressing -- 44  2. Pt will hold an abdominal crunch for at least 15 seconds, demonstrating improved core strength for activity endurance.              IE: 10 seconds with abdominal tremor              Current: met -- pt holds crunch for 15 seconds with abdominal tremor  3. Pt will improve hip flexion and extension strength to 4/5 or better to improve walking endurance and capacity to perform ADLs.             GN: hip flexion MMT B 3+/5, right hip extension MMT 4-/5 and left 3+/5              Current: met -- MMT hip flexion right 4/5 left 4+/5, hip extension right 4/5 left 5/5  4. Pt will walk for 10 minutes on the treadmill set to 0.8mph or greater, demonstrating improved ambulation tolerance.              IE: d/c from previous facility, ambulates 5 minutes at 0.8mph before needs to rest              Current: progressing -- pt ambulates on treadmill at 1mph for 8 minutes before back pain requires her to rest   5.  Pt will indicate readiness to transition to land-based therapy upon reports of improved endurance with standing and ambulation for at least 10 minutes at home.              IE: back pain with radiculopathy limits land therapy progression Current: progressing -- pt reports improved endurance at home but will still need sit breaks after a few minutes of walking   6. Pt will report decreased pain to 2/10 or better without lower extremity reference to improve QOL.               FI: pain ranges from 1-10/10              Current: progressing -- pt reports 2/10 pain with right LE reference     PLAN  []  Upgrade activities as tolerated     [x]  Continue plan of care  []  Update interventions per flow sheet       []  Discharge due to:_  []  Other:_      Latoya Rojas PTA 2/6/2020  3:12 PM    Future Appointments   Date Time Provider [place]on [place]   2/11/2020  3:45 PM Piotr Nine, PTA [facility]   2/13/2020  3:45 PM Piotr Nine, PTA [facility]   2/18/2020  3:45 PM Piotr Nine, PTA [facility]   2/20/2020  3:45 PM Piotr Nine, PTA [facility]

## 2020-02-11 ENCOUNTER — HOSPITAL ENCOUNTER (OUTPATIENT)
Dept: PHYSICAL THERAPY | Age: 60
Discharge: HOME | End: 2020-02-11
Payer: COMMERCIAL

## 2020-02-11 PROCEDURE — 97113 AQUATIC THERAPY/EXERCISES: CPT

## 2020-02-11 NOTE — PROGRESS NOTES
PT DAILY TREATMENT NOTE 10-18    Patient Name: Artie Rueda  Date:2020  : 1960  [x]  Patient  Verified  Payor: BLUE CROSS / Plan:  [facility] [place] / Product Type: PPO /    In time:3:45  Out time:4:31  Total Treatment Time (min): 46  Visit #: 2 of 10    Medicare/BCBS Only   Total Timed Codes (min):  46 1:1 Treatment Time:  46       Treatment Area: Unsteady gait [R26.81]  Low back pain [M54.5]    SUBJECTIVE  Pain Level (0-10 scale): 4/10  Any medication changes, allergies to medications, adverse drug reactions, diagnosis change, or new procedure performed?: [x] No    [] Yes (see summary sheet for update)  Subjective functional status/changes:   [] No changes reported  Pt reports she has some phantom pains in her right leg. OBJECTIVE    46 min Therapeutic Exercise:  [x] See flow sheet :   Rationale: increase ROM and increase strength to improve the patients ability to tolerate ADLs. With   [] TE   [] TA   [] neuro   [] other: Patient Education: [x] Review HEP    [] Progressed/Changed HEP based on:   [] positioning   [] body mechanics   [] transfers   [] heat/ice application    [] other:      Other Objective/Functional Measures: progressed TM walking to 9 minutes at 1.1 mph. Pain Level (0-10 scale) post treatment: 1/10    ASSESSMENT/Changes in Function: Pt has a reduction in symptoms after treatment. Pt continues to demonstrate improved functional mobility and endurance. Patient will continue to benefit from skilled PT services to modify and progress therapeutic interventions, address functional mobility deficits, address ROM deficits, address strength deficits and analyze and address soft tissue restrictions to attain remaining goals. []  See Plan of Care  []  See progress note/recertification  []  See Discharge Summary         Progress towards goals / Updated goals:  Short Term Goals: To be accomplished in 2 weeks:  1.  Pt will report compliance with her HEP 1-2 times daily to maximize therapeutic benefit.              IE: HEP assigned              Current: met pt reports doing them daily and that the stretches have assisted to decrease back pain. 2. Pt will complete 30-45 minute aquatic therapy session without increase in fatigue to improve ability to perform ADLs.             MG: Pt reports fatigue following 5 minutes of standing/walking              Current: met-- Pt tolerates 45 minute therapy session without increase in fatigue   Long Term Goals: To be accomplished in 6 weeks:  1. Pt will improve FOTO score to 48, demonstrating improved functional outcomes.             Z              OIBWYIP: progressing -- 44  2. Pt will hold an abdominal crunch for at least 15 seconds, demonstrating improved core strength for activity endurance.              IE: 10 seconds with abdominal tremor              Current: met -- pt holds crunch for 15 seconds with abdominal tremor  3. Pt will improve hip flexion and extension strength to 4/5 or better to improve walking endurance and capacity to perform ADLs.             YG: hip flexion MMT B 3+/5, right hip extension MMT 4-/5 and left 3+/5              Current: met -- MMT hip flexion right 4/5 left 4+/5, hip extension right 4/5 left 5/5  4. Pt will walk for 10 minutes on the treadmill set to 0.8mph or greater, demonstrating improved ambulation tolerance.              IE: d/c from previous facility, ambulates 5 minutes at 0.8mph before needs to rest              Current: progressing -- pt ambulates on treadmill at 1mph for 8 minutes before back pain requires her to rest   5.  Pt will indicate readiness to transition to land-based therapy upon reports of improved endurance with standing and ambulation for at least 10 minutes at home.              IE: back pain with radiculopathy limits land therapy progression              Current: progressing -- pt reports improved endurance at home but will still need sit breaks after a few minutes of walking   6. Pt will report decreased pain to 2/10 or better without lower extremity reference to improve QOL.               YQ: pain ranges from 1-10/10              Current: progressing -- pt reports 2/10 pain with right LE reference        PLAN  []  Upgrade activities as tolerated     [x]  Continue plan of care  []  Update interventions per flow sheet       []  Discharge due to:_  []  Other:_      Eliot Buenrostro, GEORGETTE 2/11/2020  3:41 PM    Future Appointments   Date Time Provider [place]   2/11/2020  3:45 PM Jenny Balls, PTA [facility]   2/13/2020  3:45 PM Jenny Balls, PTA [facility]   2/18/2020  3:45 PM Jenny Balls, PTA [facility]   2/20/2020  3:45 PM Jenny Balls, PTA [facility]

## 2020-02-13 ENCOUNTER — HOSPITAL ENCOUNTER (OUTPATIENT)
Dept: PHYSICAL THERAPY | Age: 60
Discharge: HOME | End: 2020-02-13
Payer: COMMERCIAL

## 2020-02-13 PROCEDURE — 97113 AQUATIC THERAPY/EXERCISES: CPT

## 2020-02-13 NOTE — PROGRESS NOTES
PT DAILY TREATMENT NOTE 10-18    Patient Name: Dru Estrada  Date:2020  : 1960  [x]  Patient  Verified  Payor: BLUE CROSS / Plan: [facility] [place] / Product Type: PPO /    In time:3:44  Out time:4:31  Total Treatment Time (min): 47  Visit #: 3 of 10    Medicare/BCBS Only   Total Timed Codes (min):  47 1:1 Treatment Time:  47       Treatment Area: Unsteady gait [R26.81]  Low back pain [M54.5]    SUBJECTIVE  Pain Level (0-10 scale): 4/10  Any medication changes, allergies to medications, adverse drug reactions, diagnosis change, or new procedure performed?: [x] No    [] Yes (see summary sheet for update)  Subjective functional status/changes:   [] No changes reported  Pt reports she had to go to a  yesterday and she was sitting on hard chairs for a while causing her to have increased pain today. OBJECTIVE    47 min Therapeutic Exercise:  [x] See flow sheet :   Rationale: increase ROM and increase strength to improve the patients ability to tolerate ADLs. With   [] TE   [] TA   [] neuro   [] other: Patient Education: [x] Review HEP    [] Progressed/Changed HEP based on:   [] positioning   [] body mechanics   [] transfers   [] heat/ice application    [] other:      Other Objective/Functional Measures: progressed side stepping to walking laterally on treadmill. Pain Level (0-10 scale) post treatment: 2/10    ASSESSMENT/Changes in Function: Pt demonstrates improved endurance with ability to perform 4 way treadmill for 15 minutes. Patient will continue to benefit from skilled PT services to modify and progress therapeutic interventions, address functional mobility deficits, address ROM deficits, address strength deficits, analyze and address soft tissue restrictions, analyze and cue movement patterns and analyze and modify body mechanics/ergonomics to attain remaining goals.      []  See Plan of Care  []  See progress note/recertification  []  See Discharge Summary Progress towards goals / Updated goals:  Short Term Goals: To be accomplished in 2 weeks:  1. Pt will report compliance with her HEP 1-2 times daily to maximize therapeutic benefit.              IE: HEP assigned              Current: met pt reports doing them daily and that the stretches have assisted to decrease back pain. 2. Pt will complete 30-45 minute aquatic therapy session without increase in fatigue to improve ability to perform ADLs.             EM: Pt reports fatigue following 5 minutes of standing/walking              Current: met-- Pt tolerates 45 minute therapy session without increase in fatigue   Long Term Goals: To be accomplished in 6 weeks:  1. Pt will improve FOTO score to 48, demonstrating improved functional outcomes.             FJ: 52              EXGVMEJ: progressing -- 44  2. Pt will hold an abdominal crunch for at least 15 seconds, demonstrating improved core strength for activity endurance.              IE: 10 seconds with abdominal tremor              Current: met -- pt holds crunch for 15 seconds with abdominal tremor  3. Pt will improve hip flexion and extension strength to 4/5 or better to improve walking endurance and capacity to perform ADLs.             LC: hip flexion MMT B 3+/5, right hip extension MMT 4-/5 and left 3+/5              Current: met -- MMT hip flexion right 4/5 left 4+/5, hip extension right 4/5 left 5/5  4. Pt will walk for 10 minutes on the treadmill set to 0.8mph or greater, demonstrating improved ambulation tolerance.              IE: d/c from previous facility, ambulates 5 minutes at 0.8mph before needs to rest              Current: progressing -- Pt ambulates at 1.1 mph for 9 minutes. 2/13/2020  5.  Pt will indicate readiness to transition to land-based therapy upon reports of improved endurance with standing and ambulation for at least 10 minutes at home.              IE: back pain with radiculopathy limits land therapy progression              Current: progressing -- pt reports improved endurance at home but will still need sit breaks after a few minutes of walking   6. Pt will report decreased pain to 2/10 or better without lower extremity reference to improve QOL.               IA: pain ranges from 1-10/10              Current: progressing -- pt reports 2/10 pain with right LE reference        PLAN  []  Upgrade activities as tolerated     [x]  Continue plan of care  []  Update interventions per flow sheet       []  Discharge due to:_  []  Other:_      Edgar Carmona PTA 2/13/2020  3:47 PM    Future Appointments   Date Time Provider [place]   2/18/2020  3:45 PM Pilar Margarito, PTA [facility]   2/20/2020  3:45 PM Pilar Margarito, PTA [facility]

## 2020-02-18 ENCOUNTER — HOSPITAL ENCOUNTER (OUTPATIENT)
Dept: PHYSICAL THERAPY | Age: 60
Discharge: HOME | End: 2020-02-18
Payer: COMMERCIAL

## 2020-02-18 PROCEDURE — 97113 AQUATIC THERAPY/EXERCISES: CPT

## 2020-02-18 NOTE — PROGRESS NOTES
PT DAILY TREATMENT NOTE 10-18    Patient Name: Ozzy Ignacio  Date:2020  : 1960  [x]  Patient  Verified  Payor: BLUE CROSS / Plan:  [facility] / Product Type: PPO /    In time:3:42  Out time:4:35  Total Treatment Time (min): 53  Visit #: 4 of 10    Medicare/BCBS Only   Total Timed Codes (min):  53 1:1 Treatment Time:  53       Treatment Area: Unsteady gait [R26.81]  Low back pain [M54.5]    SUBJECTIVE  Pain Level (0-10 scale): 3/10  Any medication changes, allergies to medications, adverse drug reactions, diagnosis change, or new procedure performed?: [x] No    [] Yes (see summary sheet for update)  Subjective functional status/changes:   [] No changes reported  Pt reports she has some pain in her hip. OBJECTIVE    53 min Therapeutic Exercise:  [x] See flow sheet :   Rationale: increase ROM and increase strength to improve the patients ability to tolerate ADLs. With   [] TE   [] TA   [] neuro   [] other: Patient Education: [x] Review HEP    [] Progressed/Changed HEP based on:   [] positioning   [] body mechanics   [] transfers   [] heat/ice application    [] other:      Other Objective/Functional Measures: Added side step ups. Pain Level (0-10 scale) post treatment: 0/10    ASSESSMENT/Changes in Function: Pt demonstrates improved functional mobility with no increased symptoms post treatment and no increased symptoms. Patient will continue to benefit from skilled PT services to modify and progress therapeutic interventions, address functional mobility deficits, address ROM deficits, address strength deficits, analyze and address soft tissue restrictions, analyze and cue movement patterns and analyze and modify body mechanics/ergonomics to attain remaining goals. []  See Plan of Care  []  See progress note/recertification  []  See Discharge Summary         Progress towards goals / Updated goals:  Short Term Goals: To be accomplished in 2 weeks:  1.  Pt will report compliance with her HEP 1-2 times daily to maximize therapeutic benefit.              IE: HEP assigned              Current: met pt reports doing them daily and that the stretches have assisted to decrease back pain. 2. Pt will complete 30-45 minute aquatic therapy session without increase in fatigue to improve ability to perform ADLs.             DD: Pt reports fatigue following 5 minutes of standing/walking              Current: met-- Pt tolerates 45 minute therapy session without increase in fatigue   Long Term Goals: To be accomplished in 6 weeks:  1. Pt will improve FOTO score to 48, demonstrating improved functional outcomes.             LL: 47              IYSRGJZ: progressing -- 44  2. Pt will hold an abdominal crunch for at least 15 seconds, demonstrating improved core strength for activity endurance.              IE: 10 seconds with abdominal tremor              Current: met -- pt holds crunch for 15 seconds with abdominal tremor  3. Pt will improve hip flexion and extension strength to 4/5 or better to improve walking endurance and capacity to perform ADLs.             RS: hip flexion MMT B 3+/5, right hip extension MMT 4-/5 and left 3+/5              Current: met -- MMT hip flexion right 4/5 left 4+/5, hip extension right 4/5 left 5/5  4. Pt will walk for 10 minutes on the treadmill set to 0.8mph or greater, demonstrating improved ambulation tolerance.              IE: d/c from previous facility, ambulates 5 minutes at 0.8mph before needs to rest              Current: progressing -- Pt ambulates at 1.1 mph for 9 minutes. 2/13/2020  5.  Pt will indicate readiness to transition to land-based therapy upon reports of improved endurance with standing and ambulation for at least 10 minutes at home.              IE: back pain with radiculopathy limits land therapy progression              Current: progressing -- pt reports improved endurance at home but will still need sit breaks after a few minutes of walking   6. Pt will report decreased pain to 2/10 or better without lower extremity reference to improve QOL.               NS: pain ranges from 1-10/10              Current: progressing -- pt reports 2/10 pain with right LE reference     PLAN   []  Upgrade activities as tolerated     [x]  Continue plan of care  []  Update interventions per flow sheet       []  Discharge due to:_  []  Other:_      Lydia Goins PTA 2/18/2020  3:38 PM    Future Appointments   Date Time Provider [place]   2/18/2020  3:45 PM Dougie Trotter PTA [facility]   2/20/2020  3:45 PM [facility]

## 2020-02-20 ENCOUNTER — HOSPITAL ENCOUNTER (OUTPATIENT)
Dept: PHYSICAL THERAPY | Age: 60
Discharge: HOME | End: 2020-02-20
Payer: COMMERCIAL

## 2020-02-20 PROCEDURE — 97113 AQUATIC THERAPY/EXERCISES: CPT

## 2020-02-20 NOTE — PROGRESS NOTES
PT DAILY TREATMENT NOTE 10-18    Patient Name: Roya Arevalo  Date:2020  : 1960  [x]  Patient  Verified  Payor: BLUE CROSS / Plan:  [facility] / Product Type: PPO /    In time:3:43  Out time:4:30  Total Treatment Time (min): 47  Visit #: 5 of 10    Medicare/BCBS Only   Total Timed Codes (min):  47 1:1 Treatment Time:  47       Treatment Area: Unsteady gait [R26.81]  Low back pain [M54.5]    SUBJECTIVE  Pain Level (0-10 scale): 3/10  Any medication changes, allergies to medications, adverse drug reactions, diagnosis change, or new procedure performed?: [x] No    [] Yes (see summary sheet for update)  Subjective functional status/changes:   [] No changes reported  Pt reports continued hip pain today. OBJECTIVE    47 min Therapeutic Exercise:  [x] See flow sheet :   Rationale: increase ROM and increase strength to improve the patients ability to tolerate ADLs. With   [] TE   [] TA   [] neuro   [] other: Patient Education: [x] Review HEP    [] Progressed/Changed HEP based on:   [] positioning   [] body mechanics   [] transfers   [] heat/ice application    [] other:      Other Objective/Functional Measures: progressed treadmill to x4 for 5 minutes each direction. Pain Level (0-10 scale) post treatment: 3/10    ASSESSMENT/Changes in Function: Pt demonstrates improved endurance when ambulating on treadmill. Patient will continue to benefit from skilled PT services to modify and progress therapeutic interventions, address functional mobility deficits, address ROM deficits, address strength deficits, analyze and address soft tissue restrictions, analyze and cue movement patterns, analyze and modify body mechanics/ergonomics and assess and modify postural abnormalities to attain remaining goals.      []  See Plan of Care  []  See progress note/recertification  []  See Discharge Summary         Progress towards goals / Updated goals:  Short Term Goals: To be accomplished in 2 weeks:  1. Pt will report compliance with her HEP 1-2 times daily to maximize therapeutic benefit.              IE: HEP assigned              Current: met pt reports doing them daily and that the stretches have assisted to decrease back pain. 2. Pt will complete 30-45 minute aquatic therapy session without increase in fatigue to improve ability to perform ADLs.             MS: Pt reports fatigue following 5 minutes of standing/walking              Current: met-- Pt tolerates 45 minute therapy session without increase in fatigue   Long Term Goals: To be accomplished in 6 weeks:  1. Pt will improve FOTO score to 48, demonstrating improved functional outcomes.             BH: 65              BNZENMW: progressing -- 44  2. Pt will hold an abdominal crunch for at least 15 seconds, demonstrating improved core strength for activity endurance.              IE: 10 seconds with abdominal tremor              Current: met -- pt holds crunch for 15 seconds with abdominal tremor  3. Pt will improve hip flexion and extension strength to 4/5 or better to improve walking endurance and capacity to perform ADLs.             HL: hip flexion MMT B 3+/5, right hip extension MMT 4-/5 and left 3+/5              Current: met -- MMT hip flexion right 4/5 left 4+/5, hip extension right 4/5 left 5/5  4. Pt will walk for 10 minutes on the treadmill set to 0.8mph or greater, demonstrating improved ambulation tolerance.              IE: d/c from previous facility, ambulates 5 minutes at 0.8mph before needs to rest              Current: progressing -- Pt ambulates at 1.1 mph for 9 minutes. 2/13/2020  5.  Pt will indicate readiness to transition to land-based therapy upon reports of improved endurance with standing and ambulation for at least 10 minutes at home.              IE: back pain with radiculopathy limits land therapy progression              Current: progressing -- pt reports improved endurance at home but will still need sit breaks after a few minutes of walking   6. Pt will report decreased pain to 2/10 or better without lower extremity reference to improve QOL.             NL: pain ranges from 1-10/10              Current: progressing -- pt reports 2/10 pain with right LE reference        PLAN  []  Upgrade activities as tolerated     [x]  Continue plan of care  []  Update interventions per flow sheet       []  Discharge due to:_  []  Other:_      Champ Harrington, PTA 2/20/2020  3:49 PM    No future appointments.

## 2020-02-25 ENCOUNTER — HOSPITAL ENCOUNTER (OUTPATIENT)
Dept: PHYSICAL THERAPY | Age: 60
Discharge: HOME | End: 2020-02-25
Payer: COMMERCIAL

## 2020-02-25 PROCEDURE — 97113 AQUATIC THERAPY/EXERCISES: CPT

## 2020-02-25 NOTE — PROGRESS NOTES
PT DAILY TREATMENT NOTE 10-18    Patient Name: Rosalva Goldberg  Date:2020  : 1960  [x]  Patient  Verified  Payor: BLUE CROSS / Plan:  [facility] / Product Type: PPO /    In time:3:40  Out time:4:30  Total Treatment Time (min): 50  Visit #: 6 of 10    Medicare/BCBS Only   Total Timed Codes (min):  50 1:1 Treatment Time:  50       Treatment Area: Unsteady gait [R26.81]  Low back pain [M54.5]    SUBJECTIVE  Pain Level (0-10 scale): 0/10  Any medication changes, allergies to medications, adverse drug reactions, diagnosis change, or new procedure performed?: [x] No    [] Yes (see summary sheet for update)  Subjective functional status/changes:   [] No changes reported  Pt reports she is able to do more social activities but feels that if she has to do stairs she would have a very difficult time. OBJECTIVE     50 min Therapeutic Exercise:  [x] See flow sheet :   Rationale: increase ROM and increase strength to improve the patients ability to tolerate ADLs. With   [] TE   [] TA   [] neuro   [] other: Patient Education: [x] Review HEP    [] Progressed/Changed HEP based on:   [] positioning   [] body mechanics   [] transfers   [] heat/ice application    [] other:      Other Objective/Functional Measures: progressed step ups to dynamic step ups. Pain Level (0-10 scale) post treatment: 0/10    ASSESSMENT/Changes in Function: Pt continues to demonstrate improved gait efficiency and endurance with decreased fatigue and improved gait mechanics when ambulating in community. Patient will continue to benefit from skilled PT services to modify and progress therapeutic interventions, address functional mobility deficits, address ROM deficits, address strength deficits, analyze and address soft tissue restrictions, analyze and cue movement patterns and analyze and modify body mechanics/ergonomics to attain remaining goals.      []  See Plan of Care  []  See progress note/recertification  []  See Discharge Summary         Progress towards goals / Updated goals:  Short Term Goals: To be accomplished in 2 weeks:  1. Pt will report compliance with her HEP 1-2 times daily to maximize therapeutic benefit.              IE: HEP assigned              Current: met pt reports doing them daily and that the stretches have assisted to decrease back pain. 2. Pt will complete 30-45 minute aquatic therapy session without increase in fatigue to improve ability to perform ADLs.             NN: Pt reports fatigue following 5 minutes of standing/walking              Current: met-- Pt tolerates 45 minute therapy session without increase in fatigue   Long Term Goals: To be accomplished in 6 weeks:  1. Pt will improve FOTO score to 48, demonstrating improved functional outcomes.             PF: 53              AWSQMKX: progressing -- 44  2. Pt will hold an abdominal crunch for at least 15 seconds, demonstrating improved core strength for activity endurance.              IE: 10 seconds with abdominal tremor              Current: met -- pt holds crunch for 15 seconds with abdominal tremor  3. Pt will improve hip flexion and extension strength to 4/5 or better to improve walking endurance and capacity to perform ADLs.             CHAMBERS: hip flexion MMT B 3+/5, right hip extension MMT 4-/5 and left 3+/5              Current: met -- MMT hip flexion right 4/5 left 4+/5, hip extension right 4/5 left 5/5  4. Pt will walk for 10 minutes on the treadmill set to 0.8mph or greater, demonstrating improved ambulation tolerance.              IE: d/c from previous facility, ambulates 5 minutes at 0.8mph before needs to rest              Current: progressing -- Pt ambulates at 1.1 mph for 9 minutes. 2/13/2020  5.  Pt will indicate readiness to transition to land-based therapy upon reports of improved endurance with standing and ambulation for at least 10 minutes at home.              IE: back pain with radiculopathy limits land therapy progression              Current: progressing -- pt reports improved endurance at home but will still need sit breaks after a few minutes of walking   6. Pt will report decreased pain to 2/10 or better without lower extremity reference to improve QOL.               JZ: pain ranges from 1-10/10              Current: progressing -- pt reports 2/10 pain with right LE reference     PLAN  []  Upgrade activities as tolerated     [x]  Continue plan of care  []  Update interventions per flow sheet       []  Discharge due to:_  []  Other:_      Alfredo Conklin PTA 2/25/2020  3:43 PM    Future Appointments   Date Time Provider [place]on [place]   2/25/2020  3:45 PM Sharion Li, PTA [facility]   2/27/2020  3:45 PM Sharion Li, PTA [facility]   3/3/2020  3:45 PM Sharion Li, PTA [facility]   3/5/2020  3:45 PM Sharion Li, PTA [facility]   3/10/2020  3:45 PM Sharion Li, PTA [facility]   3/12/2020  3:45 PM Sharion Li, PTA [facility]

## 2020-02-27 ENCOUNTER — APPOINTMENT (OUTPATIENT)
Dept: PHYSICAL THERAPY | Age: 60
End: 2020-02-27
Payer: COMMERCIAL

## 2020-03-03 ENCOUNTER — HOSPITAL ENCOUNTER (OUTPATIENT)
Dept: PHYSICAL THERAPY | Age: 60
Discharge: HOME | End: 2020-03-03
Payer: COMMERCIAL

## 2020-03-03 PROCEDURE — 97113 AQUATIC THERAPY/EXERCISES: CPT

## 2020-03-03 NOTE — PROGRESS NOTES
PT DAILY TREATMENT NOTE 10-18    Patient Name: Ozzy Ignacio  Date:3/3/2020  : 1960  [x]  Patient  Verified  Payor: /    In time:3:40  Out time:4:22  Total Treatment Time (min): 42  Visit #: 7 of 10    Medicare/BCBS Only   Total Timed Codes (min):  42 1:1 Treatment Time:  42       Treatment Area: Unsteady gait [R26.81]  Low back pain [M54.5]    SUBJECTIVE  Pain Level (0-10 scale): 0/10  Any medication changes, allergies to medications, adverse drug reactions, diagnosis change, or new procedure performed?: [x] No    [] Yes (see summary sheet for update)  Subjective functional status/changes:   [] No changes reported  Pt reports the \"ribbons of numbness\" are not as frequent in her lower legs and feet. OBJECTIVE    42 min Therapeutic Exercise:  [x] See flow sheet :   Rationale: increase ROM and increase strength to improve the patients ability to tolerate ADLs. With   [] TE   [] TA   [] neuro   [] other: Patient Education: [x] Review HEP    [] Progressed/Changed HEP based on:   [] positioning   [] body mechanics   [] transfers   [] heat/ice application    [] other:      Other Objective/Functional Measures: added 3# ankle weights. Pain Level (0-10 scale) post treatment: 0/10    ASSESSMENT/Changes in Function: Pt has no increased symptoms with progressed exercises and added resistance. Pt continued to demonstrate improved function and endurance. Patient will continue to benefit from skilled PT services to modify and progress therapeutic interventions, address functional mobility deficits, address ROM deficits, address strength deficits, analyze and address soft tissue restrictions, analyze and cue movement patterns and assess and modify postural abnormalities to attain remaining goals. []  See Plan of Care  []  See progress note/recertification  []  See Discharge Summary         Progress towards goals / Updated goals:  Short Term Goals: To be accomplished in 2 weeks:  1.  Pt will report compliance with her HEP 1-2 times daily to maximize therapeutic benefit.              IE: HEP assigned              Current: met pt reports doing them daily and that the stretches have assisted to decrease back pain. 2. Pt will complete 30-45 minute aquatic therapy session without increase in fatigue to improve ability to perform ADLs.             HZ: Pt reports fatigue following 5 minutes of standing/walking              Current: met-- Pt tolerates 45 minute therapy session without increase in fatigue   Long Term Goals: To be accomplished in 6 weeks:  1. Pt will improve FOTO score to 48, demonstrating improved functional outcomes.             JH: 41              KPHFFSX: progressing -- 44  2. Pt will hold an abdominal crunch for at least 15 seconds, demonstrating improved core strength for activity endurance.              IE: 10 seconds with abdominal tremor              Current: met -- pt holds crunch for 15 seconds with abdominal tremor  3. Pt will improve hip flexion and extension strength to 4/5 or better to improve walking endurance and capacity to perform ADLs.             TA: hip flexion MMT B 3+/5, right hip extension MMT 4-/5 and left 3+/5              Current: met -- MMT hip flexion right 4/5 left 4+/5, hip extension right 4/5 left 5/5  4. Pt will walk for 10 minutes on the treadmill set to 0.8mph or greater, demonstrating improved ambulation tolerance.              IE: d/c from previous facility, ambulates 5 minutes at 0.8mph before needs to rest              Current: progressing -- Pt ambulates at 1.1 mph for 9 minutes. 2/13/2020  5.  Pt will indicate readiness to transition to land-based therapy upon reports of improved endurance with standing and ambulation for at least 10 minutes at home.              IE: back pain with radiculopathy limits land therapy progression              Current: progressing -- pt reports improved endurance at home but will still need sit breaks after a few minutes of walking   6. Pt will report decreased pain to 2/10 or better without lower extremity reference to improve QOL.               NV: pain ranges from 1-10/10              Current: progressing -- pt reports 2/10 pain with right LE reference     PLAN  []  Upgrade activities as tolerated     [x]  Continue plan of care  []  Update interventions per flow sheet       []  Discharge due to:_  []  Other:_      Anisha Cece, PTA 3/3/2020  3:45 PM    Future Appointments   Date Time Provider Travon [place]   3/5/2020  3:45 PM Hansel Staggers, PTA [facility]   3/10/2020  3:45 PM Hansel Staggers, PTA [facility]   3/12/2020  3:45 PM Hansel Staggers, PTA [facility]

## 2020-03-05 ENCOUNTER — HOSPITAL ENCOUNTER (OUTPATIENT)
Dept: PHYSICAL THERAPY | Age: 60
Discharge: HOME | End: 2020-03-05
Payer: COMMERCIAL

## 2020-03-05 PROCEDURE — 97113 AQUATIC THERAPY/EXERCISES: CPT

## 2020-03-05 NOTE — PROGRESS NOTES
PT DAILY TREATMENT NOTE 10-18    Patient Name: Gwyn Bahena  Date:3/5/2020  : 1960  [x]  Patient  Verified  Payor: BLUE CROSS / Plan:  [facility] / Product Type: PPO /    In time:3:45  Out time:4:35  Total Treatment Time (min): 50  Visit #: 8 of 10    Medicare/BCBS Only   Total Timed Codes (min):  50 1:1 Treatment Time:  50       Treatment Area: Unsteady gait [R26.81]  Low back pain [M54.5]    SUBJECTIVE  Pain Level (0-10 scale): 0/10  Any medication changes, allergies to medications, adverse drug reactions, diagnosis change, or new procedure performed?: [x] No    [] Yes (see summary sheet for update)  Subjective functional status/changes:   [] No changes reported  Pt reports soreness in her back today but no pain. OBJECTIVE    50 min Therapeutic Exercise:  [x] See flow sheet :   Rationale: increase ROM and increase strength to improve the patients ability to tolerate ADLs. With   [] TE   [] TA   [] neuro   [] other: Patient Education: [x] Review HEP    [] Progressed/Changed HEP based on:   [] positioning   [] body mechanics   [] transfers   [] heat/ice application    [] other:      Other Objective/Functional Measures: FOTO:41     Pain Level (0-10 scale) post treatment: 0/10    ASSESSMENT/Changes in Function: discussed transitioning to land based PT for 1 visit a week in 1 week (alternating land and pool session). Patient will continue to benefit from skilled PT services to modify and progress therapeutic interventions, address functional mobility deficits, address ROM deficits, address strength deficits, analyze and address soft tissue restrictions, analyze and cue movement patterns, analyze and modify body mechanics/ergonomics and assess and modify postural abnormalities to attain remaining goals.      []  See Plan of Care  []  See progress note/recertification  []  See Discharge Summary         Progress towards goals / Updated goals:  Short Term Goals: To be accomplished in 2 weeks:  1. Pt will report compliance with her HEP 1-2 times daily to maximize therapeutic benefit.              IE: HEP assigned              Current: met pt reports doing them daily and that the stretches have assisted to decrease back pain. 2. Pt will complete 30-45 minute aquatic therapy session without increase in fatigue to improve ability to perform ADLs.             RH: Pt reports fatigue following 5 minutes of standing/walking              Current: met-- Pt tolerates 45 minute therapy session without increase in fatigue   Long Term Goals: To be accomplished in 6 weeks:  1. Pt will improve FOTO score to 48, demonstrating improved functional outcomes.             CJ: 66              IYNRADE: progressing --regressed 41. 3/5/2020  2. Pt will hold an abdominal crunch for at least 15 seconds, demonstrating improved core strength for activity endurance.              IE: 10 seconds with abdominal tremor              Current: met -- pt holds crunch for 15 seconds with abdominal tremor  3. Pt will improve hip flexion and extension strength to 4/5 or better to improve walking endurance and capacity to perform ADLs.             DW: hip flexion MMT B 3+/5, right hip extension MMT 4-/5 and left 3+/5              Current: met -- MMT hip flexion right 4/5 left 4+/5, hip extension right 4/5 left 5/5  4. Pt will walk for 10 minutes on the treadmill set to 0.8mph or greater, demonstrating improved ambulation tolerance.              IE: d/c from previous facility, ambulates 5 minutes at 0.8mph before needs to rest              Current: Met. 3/5/2020  5.  Pt will indicate readiness to transition to land-based therapy upon reports of improved endurance with standing and ambulation for at least 10 minutes at home.              IE: back pain with radiculopathy limits land therapy progression              Current: progressing -- pt reports improved endurance at home but will still need sit breaks after 7 minutes of walking 6. Pt will report decreased pain to 2/10 or better without lower extremity reference to improve QOL.               IW: pain ranges from 1-10/10              Current: progressing -- Pt reports pain at worst on right LE is a 4/10 but at times is 0/10    PLAN  []  Upgrade activities as tolerated     [x]  Continue plan of care  []  Update interventions per flow sheet       []  Discharge due to:_  []  Other:_      Antwan Roberts, GEORGETTE 3/5/2020  3:51 PM    Future Appointments   Date Time Provider Travon [place]   3/10/2020  3:45 PM Clarke Bonds PTA [facility]   3/12/2020  3:45 PM Clarke Bonds PTA [facility]

## 2020-03-10 ENCOUNTER — HOSPITAL ENCOUNTER (OUTPATIENT)
Dept: PHYSICAL THERAPY | Age: 60
Discharge: HOME | End: 2020-03-10
Payer: COMMERCIAL

## 2020-03-10 PROCEDURE — 97113 AQUATIC THERAPY/EXERCISES: CPT

## 2020-03-10 NOTE — PROGRESS NOTES
PT DAILY TREATMENT NOTE 10-18    Patient Name: Roya Arevalo  Date:3/10/2020  : 1960  [x]  Patient  Verified  Payor: BLUE CROSS / Plan: [employer] [facility] [employer] / Product Type: PPO /    In time:1225  Out time:119  Total Treatment Time (min): 54  Visit #: 1 of 8    Medicare/BCBS Only   Total Timed Codes (min):  54 1:1 Treatment Time:  54       Treatment Area: Unsteady gait [R26.81]  Low back pain [M54.5]    SUBJECTIVE  Pain Level (0-10 scale): 0  Any medication changes, allergies to medications, adverse drug reactions, diagnosis change, or new procedure performed?: [x] No    [] Yes (see summary sheet for update)  Subjective functional status/changes:   [] No changes reported  Pt reports having company over and has not been regularly performing HEP. States she may be self-limiting herself at home with prolonged standing and walking due to fear of radicular pain and \"feeling it coming on. \" Pt continues to be receptive to land/aqua split. OBJECTIVE    54 min Aquatic Therapy:  [x] See flow sheet : exercises initiated per flowsheet   Rationale: increase ROM and increase strength to improve the patients ability to perform ADLs with improved ease and decreased assistance. With   [] TE   [] TA   [] neuro   [] other: Patient Education: [x] Review HEP    [] Progressed/Changed HEP based on:   [] positioning   [] body mechanics   [] transfers   [] heat/ice application    [] other:      Other Objective/Functional Measures: see PN     Pain Level (0-10 scale) post treatment: 1    ASSESSMENT/Changes in Function: Pt has been making appreciable progress towards goals since her [facility] with aquatic based therapy. Improvements have been made in LE strength, LBP, and functional endurance for ADLs. Pt instructed today to begin doing dishes at home to introduce more prolonged standing into her daily routine in order to progress her tolerance of ADLs at home and regain independence.  Pt advised to continue physical therapy at a frequency of twice a week, 1x/week land therapy and 1x/week aquatics therapy. Pt will benefit from land therapy to continue to progress towards set goals. Patient will continue to benefit from skilled PT services to modify and progress therapeutic interventions, address functional mobility deficits, address ROM deficits, address strength deficits, analyze and address soft tissue restrictions, analyze and cue movement patterns, analyze and modify body mechanics/ergonomics, assess and modify postural abnormalities, address imbalance/dizziness and instruct in home and community integration to attain remaining goals. []  See Plan of Care  [x]  See progress note/recertification  []  See Discharge Summary         Progress towards goals / Updated goals:  Short Term Goals: To be accomplished in 2 weeks:  1. Pt will report compliance with her HEP 1-2 times daily to maximize therapeutic benefit.              IE: HEP assigned              Current: met pt reports doing them daily and that the stretches have assisted to decrease back pain. 2. Pt will complete 30-45 minute aquatic therapy session without increase in fatigue to improve ability to perform ADLs.             BA: Pt reports fatigue following 5 minutes of standing/walking              Current: met-- Pt tolerates 45 minute therapy session without increase in fatigue   Long Term Goals: To be accomplished in 6 weeks:  1. Pt will improve FOTO score to 48, demonstrating improved functional outcomes.             TS: 48              [id number]: progressing --regressed 41. 3/5/2020  2. Pt will hold an abdominal crunch for at least 15 seconds, demonstrating improved core strength for activity endurance.              IE: 10 seconds with abdominal tremor              Current: met -- pt holds crunch for 15 seconds with abdominal tremor  3.  Pt will improve hip flexion and extension strength to 4/5 or better to improve walking endurance and capacity to perform ADLs.              IE: hip flexion MMT B 3+/5, right hip extension MMT 4-/5 and left 3+/5              Current: met -- MMT hip flexion right 4/5 left 4+/5, hip extension right 4/5 left 5/5  4. Pt will walk for 10 minutes on the treadmill set to 0.8mph or greater, demonstrating improved ambulation tolerance.              IE: d/c from previous facility, ambulates 5 minutes at 0.8mph before needs to rest              Current: Met. 3/5/2020  5. Pt will indicate readiness to transition to land-based therapy upon reports of improved endurance with standing and ambulation for at least 10 minutes at home.              IE: back pain with radiculopathy limits land therapy progression              Current: progressing -- pt reports improved endurance at home but will still need sit breaks after 7 minutes of walking (3/10/2020)  6. Pt will report decreased pain to 2/10 or better without lower extremity reference to improve QOL.               SO: pain ranges from 1-10/10              Current: progressing -- Pt reports pain at worst on right LE is a 4/10 but at times is 0/10, with walking on land may progress to 6/10. (3/10/2020)    PLAN  [x]  Upgrade activities as tolerated     [x]  Continue plan of care  []  Update interventions per flow sheet       []  Discharge due to:_  []  Other:_      Hemalatha Yip, PT 3/10/2020  12:33 PM    Future Appointments   Date Time Provider [place]on [place]   3/12/2020  3:45 PM Gailen Mariya, PTA [facility]   3/17/2020  3:45 PM Gailen Mariya, PTA [facility]   3/19/2020  3:45 PM Gailen Mariya, PTA [facility]   3/24/2020  3:45 PM Gailen Mariya, PTA [facility]   3/26/2020  3:45 PM Gailen Mariya, PTA [facility]   3/31/2020  3:45 PM Gailen Mariya, PTA [facility]   4/2/2020  3:45 PM Gailen Mariya, PTA [facility]

## 2020-03-10 NOTE — PROGRESS NOTES
[facility]  [address]  [place], [address]  [phone] fax    Physical Therapy Progress Note  Patient name: Rosalva Goldberg Start of Care: 2020   Referral source: Galo Sweeney MD : 1960   Medical/Treatment Diagnosis: Unsteady gait [R26.81]  Low back pain [M54.5]  Payor: Tiffanie Kline / Plan: [employer] / Product Type: PPO /  Onset Date:2019     Prior Hospitalization: see medical history Provider#: [id number]   Medications: Verified on Patient Summary List    Comorbidities: back pain, BMI over 30, high blood pressure, benign essential tremors with dystonia, incontinence (controlled)  Prior Level of Function:Independent prior to loss of function in 2019  Visits from Start of Care: 15    Missed Visits: 2      Established Goals:          1. Pt will report compliance with her HEP 1-2 times daily to maximize therapeutic benefit.              IE: HEP assigned              Current: met pt reports doing them daily and that the stretches have assisted to decrease back pain. 2. Pt will complete 30-45 minute aquatic therapy session without increase in fatigue to improve ability to perform ADLs.             QA: Pt reports fatigue following 5 minutes of standing/walking              Current: met-- Pt tolerates 45 minute therapy session without increase in fatigue   3. Pt will improve FOTO score to 48, demonstrating improved functional outcomes.             JE: 38              [id number]: regressed from last PN (40), today 41   4. Pt will hold an abdominal crunch for at least 15 seconds, demonstrating improved core strength for activity endurance.              IE: 10 seconds with abdominal tremor              Current: met -- pt holds crunch for 15 seconds with abdominal tremor  5. Pt will improve hip flexion and extension strength to 4/5 or better to improve walking endurance and capacity to perform ADLs.               LG: hip flexion MMT B 3+/5, right hip extension MMT 4-/5 and left 3+/5              Current: met -- MMT hip flexion right 4/5 left 4+/5, hip extension right 4/5 left 5/5  6. Pt will walk for 10 minutes on the treadmill set to 0.8mph or greater, demonstrating improved ambulation tolerance.              IE: d/c from previous facility, ambulates 5 minutes at 0.8mph before needs to rest              Current: Met.   7. Pt will indicate readiness to transition to land-based therapy upon reports of improved endurance with standing and ambulation for at least 10 minutes at home.              IE: back pain with radiculopathy limits land therapy progression              Current: progressing -- pt reports improved endurance at home but will still need sit breaks after 7 minutes of walking   8. Pt will report decreased pain to 2/10 or better without lower extremity reference to improve QOL.             FN: pain ranges from 1-10/10              Current: progressing -- Pt reports pain at worst on right LE is a 4/10 but at times is 0/10, with walking on land may progress to 6/10. Key Functional Changes: increased strength and endurance for ADLs    Updated Goals: to be achieved in 4 weeks:  1. Pt will improve FOTO score to 48, demonstrating improved functional outcomes.             IT: regressed from last PN (44), today 41   2. Pt will walk for 10 minutes on the treadmill set to 0.8mph or greater on land, demonstrating improved ambulation tolerance.              PN: d/c from previous facility, ambulates 5 minutes at 0.8mph before needs to rest. In pool, ambulates 5 minutes forward, backward, and laterally without increase in pain. 3. Pt will indicate readiness to transition to land-based therapy upon reports of improved endurance with standing and ambulation for at least 10 minutes at home.              PN: progressing -- pt reports improved endurance at home but will still need sit breaks after 7 minutes of walking.  Indicated willingness to do aquatics 1x/week and land 1x/week. 4. Pt will report decreased pain to 2/10 or better without lower extremity reference to improve QOL.             IQ: progressing -- Pt reports pain at worst on right LE is a 4/10 but at times is 0/10, with walking on land may progress to 6/10. ASSESSMENT/RECOMMENDATIONS:  Pt has been making appreciable progress towards goals since her [facility] with aquatic based therapy. Improvements have been made in LE strength, LBP, and functional endurance for ADLs. Pt instructed today to begin doing dishes at home to introduce more prolonged standing into her daily routine in order to progress her tolerance of ADLs at home and regain independence. Pt advised to continue physical therapy at a frequency of twice a week, 1x/week land therapy and 1x/week aquatics therapy. Pt will benefit from land therapy to continue to progress towards set goals. Patient will continue to benefit from skilled PT services to modify and progress therapeutic interventions, address functional mobility deficits, address ROM deficits, address strength deficits, analyze and address soft tissue restrictions, analyze and cue movement patterns, analyze and modify body mechanics/ergonomics, assess and modify postural abnormalities, address imbalance/dizziness and instruct in home and community integration to attain remaining goals.     [x]Continue therapy per initial plan/protocol at a frequency of  2 x per week for 4 weeks  []Continue therapy with the following recommended changes:_____________________      _____________________________________________________________________  []Discontinue therapy progressing towards or have reached established goals  []Discontinue therapy due to lack of appreciable progress towards goals  []Discontinue therapy due to lack of attendance or compliance  []Await Physician's recommendations/decisions regarding therapy  []Other:________________________________________________________________    Thank you for this referral.    Triny Lamb, PT 3/10/2020 12:52 PM  NOTE TO PHYSICIAN:  PLEASE COMPLETE THE ORDERS BELOW AND   FAX TO [facility]: [phone]  If you are unable to process this request in 24 hours please contact our office: [phone]    ? I have read the above report and request that my patient continue as recommended. ? I have read the above report and request that my patient continue therapy with the following changes/special instructions:__________________________________________________________  ? I have read the above report and request that my patient be discharged from therapy.     Physicians signature: ______________________________Date: ______Time:______

## 2020-03-12 ENCOUNTER — HOSPITAL ENCOUNTER (OUTPATIENT)
Dept: PHYSICAL THERAPY | Age: 60
Discharge: HOME | End: 2020-03-12
Payer: COMMERCIAL

## 2020-03-12 PROCEDURE — 97113 AQUATIC THERAPY/EXERCISES: CPT

## 2020-03-12 NOTE — PROGRESS NOTES
PT DAILY TREATMENT NOTE 10-18    Patient Name: Redd Baez  Date:3/12/2020  : 1960  [x]  Patient  Verified  Payor: BLUE CROSS / Plan: [facility] / Product Type: PPO /    In time:3:39  Out time:4:34  Total Treatment Time (min): 55  Visit #: 1 of 8    Medicare/BCBS Only   Total Timed Codes (min):  55 1:1 Treatment Time:  55       Treatment Area: Unsteady gait [R26.81]  Low back pain [M54.5]    SUBJECTIVE  Pain Level (0-10 scale): 0/10  Any medication changes, allergies to medications, adverse drug reactions, diagnosis change, or new procedure performed?: [x] No    [] Yes (see summary sheet for update)  Subjective functional status/changes:   [] No changes reported  Pt reports no new complaints of pain. Pt reports stiffness and soreness today. OBJECTIVE     55 min Therapeutic Exercise:  [x] See flow sheet :   Rationale: increase ROM and increase strength to improve the patients ability to tolerate ADLs. With   [] TE   [] TA   [] neuro   [] other: Patient Education: [x] Review HEP    [] Progressed/Changed HEP based on:   [] positioning   [] body mechanics   [] transfers   [] heat/ice application    [] other:      Other Objective/Functional Measures:      Pain Level (0-10 scale) post treatment: 0/10    ASSESSMENT/Changes in Function: Pt demonstrates good efficiency when performing aquatic exercises. Patient will continue to benefit from skilled PT services to modify and progress therapeutic interventions, address functional mobility deficits, address ROM deficits, address strength deficits, analyze and address soft tissue restrictions, analyze and cue movement patterns and analyze and modify body mechanics/ergonomics to attain remaining goals. []  See Plan of Care  []  See progress note/recertification  []  See Discharge Summary         Progress towards goals / Updated goals:  Updated Goals: to be achieved in 4 weeks:  1.  Pt will improve FOTO score to 48, demonstrating improved functional outcomes.             OA: regressed from last PN (44), today 41   2. Pt will walk for 10 minutes on the treadmill set to 0.8mph or greater on land, demonstrating improved ambulation tolerance.              PN: d/c from previous facility, ambulates 5 minutes at 0.8mph before needs to rest. In pool, ambulates 5 minutes forward, backward, and laterally without increase in pain. 3. Pt will indicate readiness to transition to land-based therapy upon reports of improved endurance with standing and ambulation for at least 10 minutes at home.              PN: progressing -- pt reports improved endurance at home but will still need sit breaks after 7 minutes of walking. Indicated willingness to do aquatics 1x/week and land 1x/week. 4. Pt will report decreased pain to 2/10 or better without lower extremity reference to improve QOL.             VU: progressing -- Pt reports pain at worst on right LE is a 4/10 but at times is 0/10, with walking on land may progress to 6/10.      PLAN  []  Upgrade activities as tolerated     [x]  Continue plan of care  []  Update interventions per flow sheet       []  Discharge due to:_  []  Other:_      Alan Villela, PTA 3/12/2020  3:44 PM    Future Appointments   Date Time Provider Travon [place]   3/12/2020  3:45 PM Kirsty Shashank, PTA [facility]   3/17/2020  3:45 PM Kirsty Shashank, PTA [facility]   3/19/2020  3:45 PM Kirsty Shashank, PTA [facility]   3/24/2020  3:45 PM Kirsty Shashank, PTA [facility]   3/26/2020  3:45 PM Kirsty Shashank, PTA [facility]   3/31/2020  3:45 PM Kirsty Shashank, PTA [facility]   4/2/2020  3:45 PM Kirsty Shashank, PTA [facility]

## 2020-03-17 ENCOUNTER — HOSPITAL ENCOUNTER (OUTPATIENT)
Dept: PHYSICAL THERAPY | Age: 60
Discharge: HOME | End: 2020-03-17
Payer: COMMERCIAL

## 2020-03-17 PROCEDURE — 97112 NEUROMUSCULAR REEDUCATION: CPT

## 2020-03-17 PROCEDURE — 97110 THERAPEUTIC EXERCISES: CPT

## 2020-03-17 NOTE — PROGRESS NOTES
PT DAILY TREATMENT NOTE 10-18    Patient Name: Jacques Delgadillo  Date:3/17/2020  : 1960  [x]  Patient  Verified  Payor: BLUE CROSS / Plan: [employer] [facility] / Product Type: PPO /    In time:3:00  Out time:3:38  Total Treatment Time (min): 38  Visit #: 2 of 8    Medicare/BCBS Only   Total Timed Codes (min):  38 1:1 Treatment Time:  38       Treatment Area: Unsteady gait [R26.81]  Low back pain [M54.5]    SUBJECTIVE  Pain Level (0-10 scale): 0/10  Any medication changes, allergies to medications, adverse drug reactions, diagnosis change, or new procedure performed?: [x] No    [] Yes (see summary sheet for update)  Subjective functional status/changes:   [] No changes reported  Pt denies any pain currently but states she is sore in her back. OBJECTIVE    28 min Therapeutic Exercise:  [x] See flow sheet :   Rationale: increase ROM and increase strength to improve the patients ability to tolerate ADLs. 10 min Neuromuscular Re-education:  [x]  See flow sheet :   Rationale: increase strength, improve coordination and increase proprioception  to improve the patients ability to perform functional activities with increased ease. With   [] TE   [] TA   [] neuro   [] other: Patient Education: [x] Review HEP    [] Progressed/Changed HEP based on:   [] positioning   [] body mechanics   [] transfers   [] heat/ice application    [] other:      Other Objective/Functional Measures: Progressed to land based PT. Pain Level (0-10 scale) post treatment: 0/10    ASSESSMENT/Changes in Function: Pt has slight fatigue post treatment but has no adverse reaction to treatment.      Patient will continue to benefit from skilled PT services to modify and progress therapeutic interventions, address functional mobility deficits, address ROM deficits, address strength deficits, analyze and address soft tissue restrictions, analyze and cue movement patterns and analyze and modify body mechanics/ergonomics to attain remaining goals. []  See Plan of Care  []  See progress note/recertification  []  See Discharge Summary         Progress towards goals / Updated goals:  Updated Goals: to be achieved in 4 weeks:  1. Pt will improve FOTO score to 48, demonstrating improved functional outcomes.             PN: regressed from last PN (44), today 41   2. Pt will walk for 10 minutes on the treadmill set to 0.8mph or greater on land, demonstrating improved ambulation tolerance.              PN: d/c from previous facility, ambulates 5 minutes at 0.8mph before needs to rest. In pool, ambulates 5 minutes forward, backward, and laterally without increase in pain. 3. Pt will indicate readiness to transition to land-based therapy upon reports of improved endurance with standing and ambulation for at least 10 minutes at home.              PN: progressing -- pt reports improved endurance at home but will still need sit breaks after 7 minutes of walking. Indicated willingness to do aquatics 1x/week and land 1x/week. 4. Pt will report decreased pain to 2/10 or better without lower extremity reference to improve QOL.               SF: progressing -- Pt reports pain at worst on right LE is a 4/10 but at times is 0/10, with walking on land may progress to 6/10.     PLAN  []  Upgrade activities as tolerated     [x]  Continue plan of care  []  Update interventions per flow sheet       []  Discharge due to:_  []  Other:_      Ria Ward, PTA 3/17/2020  3:02 PM    Future Appointments   Date Time Provider Travon [place]   3/19/2020  3:45 PM [employer], PTA [facility]   3/24/2020  3:45 PM [employer], PTA [facility]   3/26/2020  3:45 PM [employer], PTA [facility]   3/31/2020  3:45 PM [employer], PTA [facility]   4/2/2020  3:45 PM [employer], PTA [facility]

## 2020-03-19 ENCOUNTER — HOSPITAL ENCOUNTER (OUTPATIENT)
Dept: PHYSICAL THERAPY | Age: 60
Discharge: HOME | End: 2020-03-19
Payer: COMMERCIAL

## 2020-03-19 PROCEDURE — 97113 AQUATIC THERAPY/EXERCISES: CPT

## 2020-03-19 NOTE — PROGRESS NOTES
PT DAILY TREATMENT NOTE 10-18    Patient Name: Ellie Lazcano  Date:3/19/2020  : 1960  [x]  Patient  Verified  Payor: BLUE CROSS / Plan: Keri Monahan / Product Type: PPO /    In time:2:15  Out time:3:00  Total Treatment Time (min): 45  Visit #: 3 of 8    Medicare/BCBS Only   Total Timed Codes (min):  45 1:1 Treatment Time:  45       Treatment Area: Unsteady gait [R26.81]  Low back pain [M54.5]    SUBJECTIVE  Pain Level (0-10 scale): 0/10  Any medication changes, allergies to medications, adverse drug reactions, diagnosis change, or new procedure performed?: [x] No    [] Yes (see summary sheet for update)  Subjective functional status/changes:   [] No changes reported  Pt reports feeling some fatigue but overall feels good. OBJECTIVE    45 min Therapeutic Exercise:  [x] See flow sheet :   Rationale: increase ROM and increase strength to improve the patients ability to tolerate ADLs. With   [] TE   [] TA   [] neuro   [] other: Patient Education: [x] Review HEP    [] Progressed/Changed HEP based on:   [] positioning   [] body mechanics   [] transfers   [] heat/ice application    [] other:      Other Objective/Functional Measures: Good form with all exercises. Pain Level (0-10 scale) post treatment: 0/10    ASSESSMENT/Changes in Function: Pt demonstrates good control with all aquatic exercises. No adverse reaction to treatment. Patient will continue to benefit from skilled PT services to modify and progress therapeutic interventions, address functional mobility deficits, address ROM deficits, address strength deficits, analyze and address soft tissue restrictions, analyze and cue movement patterns and analyze and modify body mechanics/ergonomics to attain remaining goals. []  See Plan of Care  []  See progress note/recertification  []  See Discharge Summary         Progress towards goals / Updated goals:  Updated Goals: to be achieved in 4 weeks:  1.  Pt will improve FOTO score to 48, demonstrating improved functional outcomes.             PN: regressed from last PN (44), today 41   2. Pt will walk for 10 minutes on the treadmill set to 0.8mph or greater on land, demonstrating improved ambulation tolerance.              PN: d/c from previous facility, ambulates 5 minutes at 0.8mph before needs to rest. In pool, ambulates 5 minutes forward, backward, and laterally without increase in pain. 3. Pt will indicate readiness to transition to land-based therapy upon reports of improved endurance with standing and ambulation for at least 10 minutes at home.              PN: progressing -- pt reports improved endurance at home but will still need sit breaks after 7 minutes of walking. Indicated willingness to do aquatics 1x/week and land 1x/week. 4. Pt will report decreased pain to 2/10 or better without lower extremity reference to improve QOL.               MS: progressing -- Pt reports pain at worst on right LE is a 4/10 but at times is 0/10, with walking on land may progress to 6/10.     PLAN  []  Upgrade activities as tolerated     [x]  Continue plan of care  []  Update interventions per flow sheet       []  Discharge due to:_  []  Other:_      Willis Duverney, PTA 3/19/2020  2:30 PM    Future Appointments   Date Time Provider Travon [place]   3/24/2020  3:45 PM [facility]   3/26/2020  3:45 PM Isidoro Timmons PTA [facility]   3/31/2020  3:45 PM Isidoro Timmons PTA [facility]   4/2/2020  3:45 PM Isidoro Timmons PTA [facility]

## 2020-03-24 ENCOUNTER — HOSPITAL ENCOUNTER (OUTPATIENT)
Dept: PHYSICAL THERAPY | Age: 60
Discharge: HOME | End: 2020-03-24
Payer: COMMERCIAL

## 2020-03-24 PROCEDURE — 97112 NEUROMUSCULAR REEDUCATION: CPT

## 2020-03-24 PROCEDURE — 97110 THERAPEUTIC EXERCISES: CPT

## 2020-03-24 NOTE — PROGRESS NOTES
PT DAILY TREATMENT NOTE 10-18    Patient Name: Artie Rueda  Date:3/24/2020  : 1960  [x]  Patient  Verified  Payor: BLUE CROSS / Plan: [employer] [facility] [place] / Product Type: PPO /    In time:11:00  Out time:11:46  Total Treatment Time (min): 46  Visit #: 4 of 8    Medicare/BCBS Only   Total Timed Codes (min):  46 1:1 Treatment Time:  46       Treatment Area: Unsteady gait [R26.81]  Low back pain [M54.5]    SUBJECTIVE  Pain Level (0-10 scale): 0/10  Any medication changes, allergies to medications, adverse drug reactions, diagnosis change, or new procedure performed?: [x] No    [] Yes (see summary sheet for update)  Subjective functional status/changes:   [] No changes reported  Pt reports no new complaints of pain. Pt reports she feels she needs further guidance on how to continue to progress strength and balance. OBJECTIVE    36 min Therapeutic Exercise:  [x] See flow sheet :   Rationale: increase ROM and increase strength to improve the patients ability to tolerate ADLs. 10 min Neuromuscular Re-education:  [x]  See flow sheet :   Rationale: increase strength, improve coordination and increase proprioception  to improve the patients ability to improve functional mobility. With   [] TE   [] TA   [] neuro   [] other: Patient Education: [x] Review HEP    [] Progressed/Changed HEP based on:   [] positioning   [] body mechanics   [] transfers   [] heat/ice application    [] other:      Other Objective/Functional Measures: Pt has slight LOB with balance exercises. Pain Level (0-10 scale) post treatment: 0/10    ASSESSMENT/Changes in Function: issued and reviewed updated HEP. Pt acknowledged understanding of all exercises.      Patient will continue to benefit from skilled PT services to modify and progress therapeutic interventions, address functional mobility deficits, address ROM deficits, address strength deficits, analyze and address soft tissue restrictions, analyze and cue movement patterns and analyze and modify body mechanics/ergonomics to attain remaining goals. []  See Plan of Care  []  See progress note/recertification  []  See Discharge Summary         Progress towards goals / Updated goals:  Updated Goals: to be achieved in 4 weeks:  1. Pt will improve FOTO score to 48, demonstrating improved functional outcomes.             PN: regressed from last PN (44), today 41   2. Pt will walk for 10 minutes on the treadmill set to 0.8mph or greater on land, demonstrating improved ambulation tolerance.              PN: d/c from previous facility, ambulates 5 minutes at 0.8mph before needs to rest. In pool, ambulates 5 minutes forward, backward, and laterally without increase in pain. 3. Pt will indicate readiness to transition to land-based therapy upon reports of improved endurance with standing and ambulation for at least 10 minutes at home.              PN: progressing -- pt reports improved endurance at home but will still need sit breaks after 7 minutes of walking. Indicated willingness to do aquatics 1x/week and land 1x/week. 4. Pt will report decreased pain to 2/10 or better without lower extremity reference to improve QOL.             MS: progressing -- Pt reports pain at worst on right LE is a 4/10 but at times is 0/10, with walking on land may progress to 6/10.     PLAN  []  Upgrade activities as tolerated     [x]  Continue plan of care  []  Update interventions per flow sheet       []  Discharge due to:_  [x]  Other: In-person therapy visits for this out-patient have been placed on temporary hold until on or after 5/26/20 in compliance with organizational directives and CDC recommendations related to the current [facility] [address] pandemic. Contact with this patient by the treating therapist may be made via telephonic e-visits and/or telehealth visits during this time, if applicable.        Linnea Chavez, GEORGETTE 3/24/2020  11:47 AM    Future Appointments   Date Time Provider Travon Austin   3/26/2020  3:45 PM Walt Apgar, PTA [facility]   3/31/2020  3:45 PM Walt Apgar, PTA [facility]   2020  3:45 PM Walt Apgar, PTA [facility]

## 2020-03-26 ENCOUNTER — APPOINTMENT (OUTPATIENT)
Dept: PHYSICAL THERAPY | Age: 60
End: 2020-03-26
Payer: COMMERCIAL

## 2020-03-31 ENCOUNTER — APPOINTMENT (OUTPATIENT)
Dept: PHYSICAL THERAPY | Age: 60
End: 2020-03-31
Payer: COMMERCIAL

## 2020-04-02 ENCOUNTER — APPOINTMENT (OUTPATIENT)
Dept: PHYSICAL THERAPY | Age: 60
End: 2020-04-02

## 2020-04-10 NOTE — PROGRESS NOTES
[facility]  [address]  [place], [address]  [phone] fax    Physical Therapy Discharge Summary  Patient name: Artie Rueda Start of Care: 20   Referral source: Tonya Georges MD : 1960   Medical/Treatment Diagnosis: Unsteady gait [R26.81]  Low back pain [M54.5]  Payor: Willis Muller / Plan: [facility] / Product Type: PPO /  Onset Date:2019     Prior Hospitalization: see medical history Provider#: [id number]   Medications: Verified on Patient Summary List    Comorbidities: back pain, BMI over 30, high blood pressure, benign essential tremors with dystonia, incontinence (controlled)  Prior Level of Function:Independent prior to loss of function in 2019  Visits from Start of Care: 19    Missed Visits: 2  Reporting Period : 3/10/20 to 3/24/20      Summary of Care:  Updated Goals: to be achieved in 4 weeks:  1. Pt will improve FOTO score to 48, demonstrating improved functional outcomes.             PN: regressed from last PN (44), today 41   Current: not assessed   2. Pt will walk for 10 minutes on the treadmill set to 0.8mph or greater on land, demonstrating improved ambulation tolerance.              PN: d/c from previous facility, ambulates 5 minutes at 0.8mph before needs to rest. In pool, ambulates 5 minutes forward, backward, and laterally without increase in pain. Current: not assessed  3. Pt will indicate readiness to transition to land-based therapy upon reports of improved endurance with standing and ambulation for at least 10 minutes at home.              PN: progressing -- pt reports improved endurance at home but will still need sit breaks after 7 minutes of walking. Indicated willingness to do aquatics 1x/week and land 1x/week. Current: not assessed  4. Pt will report decreased pain to 2/10 or better without lower extremity reference to improve QOL.               BX: progressing -- Pt reports pain at worst on right LE is a 4/10 but at times is 0/10, with walking on land may progress to 6/10.   Current: not assessed  Due to COVID-19 pandemic, patient is being discharged at this time.     ASSESSMENT/RECOMMENDATIONS:  [x]Discontinue therapy: []Patient has reached or is progressing toward set goals      []Patient is non-compliant or has abdicated      []Due to lack of appreciable progress towards set [address], PT 4/10/2020 9:49 AM

## 2020-06-24 ENCOUNTER — HOSPITAL ENCOUNTER (OUTPATIENT)
Age: 60
Discharge: HOME | End: 2020-06-24
Attending: PHYSICIAN ASSISTANT
Payer: COMMERCIAL

## 2020-06-24 DIAGNOSIS — R26.81 UNSTEADY GAIT: ICD-10-CM

## 2020-06-24 DIAGNOSIS — M54.16 LUMBAR RADICULOPATHY: ICD-10-CM

## 2020-06-24 DIAGNOSIS — G95.19 NEUROGENIC CLAUDICATION (HCC): ICD-10-CM

## 2020-06-24 PROCEDURE — 72148 MRI LUMBAR SPINE W/O DYE: CPT

## 2020-09-23 ENCOUNTER — HOSPITAL ENCOUNTER (OUTPATIENT)
Dept: CT IMAGING | Age: 60
Discharge: HOME | End: 2020-09-23
Attending: INTERNAL MEDICINE
Payer: COMMERCIAL

## 2020-09-23 VITALS — HEIGHT: 65 IN | BODY MASS INDEX: 44.98 KG/M2 | WEIGHT: 270 LBS

## 2020-09-23 DIAGNOSIS — J39.2 THROAT DRY: ICD-10-CM

## 2020-09-23 DIAGNOSIS — R91.1 PULMONARY NODULE: ICD-10-CM

## 2020-09-23 DIAGNOSIS — F17.200 TOBACCO USE DISORDER: ICD-10-CM

## 2020-09-23 PROCEDURE — G0297 LDCT FOR LUNG CA SCREEN: HCPCS

## 2020-10-14 ENCOUNTER — HOSPITAL ENCOUNTER (OUTPATIENT)
Dept: MAMMOGRAPHY | Age: 60
Discharge: HOME | End: 2020-10-14
Attending: INTERNAL MEDICINE
Payer: COMMERCIAL

## 2020-10-14 DIAGNOSIS — Z12.31 VISIT FOR SCREENING MAMMOGRAM: ICD-10-CM

## 2020-10-14 PROCEDURE — 77067 SCR MAMMO BI INCL CAD: CPT

## 2020-10-20 ENCOUNTER — HOSPITAL ENCOUNTER (OUTPATIENT)
Dept: GENERAL RADIOLOGY | Age: 60
Discharge: HOME | End: 2020-10-20
Payer: COMMERCIAL

## 2020-10-20 DIAGNOSIS — M43.06 LUMBAR SPONDYLOLYSIS: ICD-10-CM

## 2020-10-20 PROCEDURE — 72114 X-RAY EXAM L-S SPINE BENDING: CPT

## 2021-04-19 ENCOUNTER — OFFICE VISIT (OUTPATIENT)
Dept: INTERNAL MEDICINE CLINIC | Age: 61
End: 2021-04-19
Payer: COMMERCIAL

## 2021-04-19 VITALS
HEIGHT: 65 IN | HEART RATE: 76 BPM | RESPIRATION RATE: 12 BRPM | WEIGHT: 272.4 LBS | BODY MASS INDEX: 45.38 KG/M2 | DIASTOLIC BLOOD PRESSURE: 70 MMHG | SYSTOLIC BLOOD PRESSURE: 135 MMHG | OXYGEN SATURATION: 96 % | TEMPERATURE: 97.3 F

## 2021-04-19 DIAGNOSIS — E78.2 MIXED HYPERLIPIDEMIA: Primary | ICD-10-CM

## 2021-04-19 DIAGNOSIS — F17.200 CURRENT EVERY DAY SMOKER: ICD-10-CM

## 2021-04-19 DIAGNOSIS — R42 DIZZINESS: ICD-10-CM

## 2021-04-19 PROCEDURE — 99215 OFFICE O/P EST HI 40 MIN: CPT | Performed by: INTERNAL MEDICINE

## 2021-04-19 RX ORDER — MIRABEGRON 25 MG/1
TABLET, FILM COATED, EXTENDED RELEASE ORAL
COMMUNITY
Start: 2021-04-06 | End: 2022-05-04

## 2021-04-19 RX ORDER — MECLIZINE HYDROCHLORIDE 25 MG/1
25 TABLET ORAL
Qty: 360 TAB | Refills: 3 | Status: SHIPPED | OUTPATIENT
Start: 2021-04-19 | End: 2022-04-05 | Stop reason: SDUPTHER

## 2021-04-19 RX ORDER — BUPROPION HYDROCHLORIDE 100 MG/1
TABLET ORAL
Qty: 180 TAB | Refills: 1 | Status: SHIPPED | OUTPATIENT
Start: 2021-04-19 | End: 2021-06-21

## 2021-04-19 RX ORDER — GABAPENTIN 300 MG/1
300 CAPSULE ORAL
COMMUNITY
Start: 2021-04-05

## 2021-04-19 RX ORDER — ATORVASTATIN CALCIUM 80 MG/1
80 TABLET, FILM COATED ORAL DAILY
Qty: 90 TAB | Refills: 3 | Status: SHIPPED | OUTPATIENT
Start: 2021-04-19 | End: 2022-05-04 | Stop reason: ALTCHOICE

## 2021-04-19 RX ORDER — METAXALONE 800 MG/1
TABLET ORAL
COMMUNITY
Start: 2021-03-18

## 2021-04-19 RX ORDER — FLUTICASONE PROPIONATE 50 MCG
1 SPRAY, SUSPENSION (ML) NASAL DAILY
COMMUNITY

## 2021-04-19 NOTE — PROGRESS NOTES
HPI     Jennifer Watson is a 69-year-old female known to me from my previous practice, here for routine check. Has been hearing about insulin resistance, wonders if she has. We discussed that it is reasonable to check a hemoglobin A1c on her because of her morbid obesity. We discussed checking that with the next labs. Reports her tremor is worse. Requests refills for meclizine. She is smoking 1 pack a day, on questioning, may be willing to try something to quit. Has never been on Wellbutrin or Zyban. Discussed starting a low-dose of Wellbutrin, discussed need to follow-up to recheck blood pressure, as Wellbutrin can raise blood pressure in some individuals. She will also check her blood pressure at home, she has a machine, and will report significant increase. Past Medical History:   Diagnosis Date    Abnormal Pap smear of cervix     Current every day smoker     Essential tremor     Fatty liver     Hyperlipidemia     Hypertension     Lumbosacral radiculopathy at L5     Morbid obesity (HCC)     Other ill-defined conditions(799.89)     benign essential tremor-dystonia    Restless legs     OMAR (stress urinary incontinence, female)     Unspecified sleep apnea     DOES NOT SLEEP WITH CPAP        Past Surgical History:   Procedure Laterality Date    ENDOSCOPY, COLON, DIAGNOSTIC      HX DILATION AND CURETTAGE      HX GYN      conezation    HX OTHER SURGICAL      COLONIZATION        Current Outpatient Medications   Medication Sig Dispense Refill    Myrbetriq 25 mg ER tablet       metaxalone (SKELAXIN) 800 mg tablet TAKE 1 TABLET BY MOUTH THREE TIMES DAILY AS NEEDED FOR MUSCLE SPASM      gabapentin (NEURONTIN) 300 mg capsule TAKE ONE CAPSULE BY MOUTH THREE TIMES DAILY      fluticasone propionate (FLONASE) 50 mcg/actuation nasal spray 1 Spray daily.       prasterone, DHEA, (INTRAROSA VA) INSERT 1 VAGINAL INSERT BY VAGINAL ROUTE EVERY NIGHT AT BEDTIME      atorvastatin (LIPITOR) 80 mg tablet Take 1 Tab by mouth daily. 90 Tab 3    meclizine (ANTIVERT) 25 mg tablet Take 1 Tab by mouth four (4) times daily as needed for Dizziness. 360 Tab 3    buPROPion (WELLBUTRIN) 100 mg tablet 1 po q AM x 1 week, then 1  Tab 1    spironolactone (ALDACTONE) 25 mg tablet Take 25 mg by mouth daily. Indications: HYPERTENSION      aspirin 81 mg tablet Take 162 mg by mouth daily. Indications: MYOCARDIAL INFARCTION PREVENTION      ALPRAZolam (XANAX) 0.25 mg tablet Take 0.25 mg by mouth three (3) times daily as needed. Indications: ESSENTIAL TREMOR      calcium-cholecalciferol, D3, (CALCARB 600 WITH VITAMIN D) tablet Take 2 Tabs by mouth daily.  multivit-iron-FA-calcium &mins (CENTRAL-MELO WOMEN'S UNDER 50) 18 mg iron-400 mcg-500 mg Ca Tab Take 1 Cap by mouth daily.  co-enzyme Q-10 (CO Q-10) 100 mg capsule Take 100 mg by mouth daily.  lisinopril-hydrochlorothiazide (PRINZIDE, ZESTORETIC) 20-12.5 mg per tablet Take 1 Tab by mouth daily.  Omega-3 Fatty Acids-Fish Oil (OMEGA 3 FISH OIL) 684-1,200 mg CpDR Take 3 Caps by mouth daily.  trihexyphenidyl (ARTANE) 2 mg tablet Take 4 mg by mouth.  cephALEXin (KEFLEX) 250 mg capsule Take 2 Caps by mouth four (4) times daily. Indications: SKIN AND SKIN STRUCTURE INFECTION 20 Cap 1    cephALEXin (KEFLEX) 250 mg capsule Take 2 Caps by mouth four (4) times daily. 20 Cap 0    oxyCODONE-acetaminophen (PERCOCET) 7.5-325 mg per tablet Take 1 Tab by mouth every six (6) hours as needed for Pain. Max Daily Amount: 4 Tabs. 40 Tab 0    ascorbic acid (VITAMIN C) 500 mg tablet Take 500 mg by mouth daily. Indications: VITAMIN C DEFICIENCY      COU23-YEVT fum,bisgly-folic ac 28 mg iron -1 mg chew Take 1 Tab by mouth daily.           Allergies   Allergen Reactions    Sunscreen Rash        Social History     Socioeconomic History    Marital status:      Spouse name: Not on file    Number of children: Not on file    Years of education: Not on file    Highest education level: Not on file   Occupational History    Not on file   Social Needs    Financial resource strain: Not on file    Food insecurity     Worry: Not on file     Inability: Not on file    Transportation needs     Medical: Not on file     Non-medical: Not on file   Tobacco Use    Smoking status: Current Every Day Smoker     Packs/day: 1.00     Years: 41.00     Pack years: 41.00    Smokeless tobacco: Never Used   Substance and Sexual Activity    Alcohol use:  Yes     Alcohol/week: 6.0 standard drinks     Types: 2 Glasses of wine, 4 Shots of liquor per week     Comment: 6 drinks weekly    Drug use: No    Sexual activity: Not on file   Lifestyle    Physical activity     Days per week: Not on file     Minutes per session: Not on file    Stress: Not on file   Relationships    Social connections     Talks on phone: Not on file     Gets together: Not on file     Attends Tenriism service: Not on file     Active member of club or organization: Not on file     Attends meetings of clubs or organizations: Not on file     Relationship status: Not on file    Intimate partner violence     Fear of current or ex partner: Not on file     Emotionally abused: Not on file     Physically abused: Not on file     Forced sexual activity: Not on file   Other Topics Concern    Not on file   Social History Narrative    Not on file        Family History   Problem Relation Age of Onset    Hypertension Mother     Stroke Mother     Asthma Mother     Liver Disease Father         hepatitis,  at 52    Heart Disease Brother         MI at 52    Heart Disease Sister     MS Maternal Aunt     Stroke Paternal Grandmother     Heart Disease Paternal Grandfather     Osteoporosis Sister         osteopenia, VSD           Visit Vitals  /70 (BP 1 Location: Left upper arm, BP Patient Position: Sitting)   Pulse 76   Temp 97.3 °F (36.3 °C) (Oral)   Resp 12   Ht 5' 5\" (1.651 m)   Wt 272 lb 6.4 oz (123.6 kg) SpO2 96%   BMI 45.33 kg/m²        ROS     She denies headache or blurry vision. Endocrine: She denies polyuria, polydipsia, or tingling in her feet. Cor: She denies chest pain. Pulmonary: She denies hemoptysis or shortness of breath. GI she denies rectal bleeding. :  : She denies hematuria or vaginal bleeding. Physical Exam     She is morbidly obese. She has a significant tremor of her head. HEENT: Sclera are anicteric, conjunctiva are pink. Lymph: There are no cervical or supraclavicular nodes appreciated. Neck: Carotid upstrokes are normal bilaterally. CV: Heart has regular rate and rhythm with a soft systolic murmur, no rubs, no gallops. Lungs: Clear to auscultation, good air movement. GI abdomen is obese, soft, nontender. Extremities: Without clubbing, cyanosis, or edema. Psych: Normal mood        Diagnoses and all orders for this visit:    1. Mixed hyperlipidemia  -     atorvastatin (LIPITOR) 80 mg tablet; Take 1 Tab by mouth daily. 2. Dizziness  -     meclizine (ANTIVERT) 25 mg tablet; Take 1 Tab by mouth four (4) times daily as needed for Dizziness. 3. Current every day smoker  -     buPROPion (WELLBUTRIN) 100 mg tablet; 1 po q AM x 1 week, then 1 BID         Follow-up and Dispositions    · Return in about 2 months (around 6/19/2021) for rotine follow up. Time spent on encounter    Review of records and precharting13  Review of updated medical, social, and family Remigio Han review of systems and exam10 orders3 counseling5    Total time spent on encounter 42 minutes.       Aubrey Auguste MD

## 2021-06-21 ENCOUNTER — OFFICE VISIT (OUTPATIENT)
Dept: INTERNAL MEDICINE CLINIC | Age: 61
End: 2021-06-21
Payer: COMMERCIAL

## 2021-06-21 VITALS
DIASTOLIC BLOOD PRESSURE: 70 MMHG | HEART RATE: 78 BPM | TEMPERATURE: 97.9 F | WEIGHT: 271.2 LBS | SYSTOLIC BLOOD PRESSURE: 111 MMHG | RESPIRATION RATE: 14 BRPM | BODY MASS INDEX: 45.18 KG/M2 | OXYGEN SATURATION: 98 % | HEIGHT: 65 IN

## 2021-06-21 DIAGNOSIS — F17.200 CURRENT EVERY DAY SMOKER: Primary | ICD-10-CM

## 2021-06-21 DIAGNOSIS — H92.02 ACUTE PAIN OF LEFT EAR: ICD-10-CM

## 2021-06-21 PROCEDURE — 99214 OFFICE O/P EST MOD 30 MIN: CPT | Performed by: INTERNAL MEDICINE

## 2021-06-21 NOTE — PROGRESS NOTES
HPI     Hawa Metcalf returns to follow-up her blood pressure after being prescribed Wellbutrin. She filled the Wellbutrin, but did not start it after reading the package insert. She is worried about side effects such as seizures, and interactions with her other medications. She wonders if her tremor is some sort of seizure, I reassured her that it is not. The package insert has warnings about taking if 1 has Parkinson's; we reviewed that though she is on some medications that can be used in Parkinson's, she does not carry a diagnosis of Parkinson's disease. She complains of 4 days of left ear discomfort, like there is water in it, without other cold symptoms. Past Medical History:   Diagnosis Date    Abnormal Pap smear of cervix     Current every day smoker     Essential tremor     Fatty liver     Hyperlipidemia     Hypertension     Lumbosacral radiculopathy at L5     Morbid obesity (HCC)     Other ill-defined conditions(799.89)     benign essential tremor-dystonia    Restless legs     OMAR (stress urinary incontinence, female)     Unspecified sleep apnea     DOES NOT SLEEP WITH CPAP        Past Surgical History:   Procedure Laterality Date    ENDOSCOPY, COLON, DIAGNOSTIC      HX DILATION AND CURETTAGE      HX GYN      conezation    HX OTHER SURGICAL      COLONIZATION        Current Outpatient Medications   Medication Sig Dispense Refill    Myrbetriq 25 mg ER tablet       metaxalone (SKELAXIN) 800 mg tablet TAKE 1 TABLET BY MOUTH THREE TIMES DAILY AS NEEDED FOR MUSCLE SPASM      gabapentin (NEURONTIN) 300 mg capsule TAKE ONE CAPSULE BY MOUTH THREE TIMES DAILY      fluticasone propionate (FLONASE) 50 mcg/actuation nasal spray 1 Spray daily.  prasterone, DHEA, (INTRAROSA VA) INSERT 1 VAGINAL INSERT BY VAGINAL ROUTE EVERY NIGHT AT BEDTIME      atorvastatin (LIPITOR) 80 mg tablet Take 1 Tab by mouth daily.  90 Tab 3    meclizine (ANTIVERT) 25 mg tablet Take 1 Tab by mouth four (4) times daily as needed for Dizziness. 360 Tab 3    spironolactone (ALDACTONE) 25 mg tablet Take 25 mg by mouth daily. Indications: HYPERTENSION      ascorbic acid (VITAMIN C) 500 mg tablet Take 500 mg by mouth daily. Indications: VITAMIN C DEFICIENCY      aspirin 81 mg tablet Take 162 mg by mouth daily. Indications: MYOCARDIAL INFARCTION PREVENTION      ALPRAZolam (XANAX) 0.25 mg tablet Take 0.25 mg by mouth three (3) times daily as needed. Indications: ESSENTIAL TREMOR      calcium-cholecalciferol, D3, (CALCARB 600 WITH VITAMIN D) tablet Take 2 Tabs by mouth daily.  multivit-iron-FA-calcium &mins (CENTRAL-MELO WOMEN'S UNDER 50) 18 mg iron-400 mcg-500 mg Ca Tab Take 1 Cap by mouth daily.  co-enzyme Q-10 (CO Q-10) 100 mg capsule Take 100 mg by mouth daily.  lisinopril-hydrochlorothiazide (PRINZIDE, ZESTORETIC) 20-12.5 mg per tablet Take 1 Tab by mouth daily.  Omega-3 Fatty Acids-Fish Oil (OMEGA 3 FISH OIL) 684-1,200 mg CpDR Take 3 Caps by mouth daily.  trihexyphenidyl (ARTANE) 2 mg tablet Take 4 mg by mouth. Allergies   Allergen Reactions    Sunscreen Rash        Social History     Socioeconomic History    Marital status:      Spouse name: Not on file    Number of children: Not on file    Years of education: Not on file    Highest education level: Not on file   Occupational History    Not on file   Tobacco Use    Smoking status: Current Every Day Smoker     Packs/day: 1.00     Years: 41.00     Pack years: 41.00    Smokeless tobacco: Never Used   Substance and Sexual Activity    Alcohol use:  Yes     Alcohol/week: 6.0 standard drinks     Types: 2 Glasses of wine, 4 Shots of liquor per week     Comment: 6 drinks weekly    Drug use: No    Sexual activity: Not on file   Other Topics Concern    Not on file   Social History Narrative    Not on file     Social Determinants of Health     Financial Resource Strain:     Difficulty of Paying Living Expenses: Food Insecurity:     Worried About Running Out of Food in the Last Year:     [address] in the Last Year:    Transportation Needs:     Lack of Transportation (Medical):  Lack of Transportation (Non-Medical):    Physical Activity:     Days of Exercise per Week:     Minutes of Exercise per Session:    Stress:     Feeling of Stress :    Social Connections:     Frequency of Communication with Friends and Family:     Frequency of Social Gatherings with Friends and Family:     Attends Sabianist Services:     Active Member of Clubs or Organizations:     Attends Club or Organization Meetings:     Marital Status:    Intimate Partner Violence:     Fear of Current or Ex-Partner:     Emotionally Abused:     Physically Abused:     Sexually Abused:         Family History   Problem Relation Age of Onset    Hypertension Mother     Stroke Mother     Asthma Mother     Liver Disease Father         hepatitis,  at 52    Heart Disease Brother         MI at 52    Heart Disease Sister     MS Maternal Aunt     Stroke Paternal Grandmother     Heart Disease Paternal Grandfather     Osteoporosis Sister         osteopenia, VSD           Visit Vitals  /70 (BP 1 Location: Left upper arm, BP Patient Position: Sitting)   Pulse 78   Temp 97.9 °F (36.6 °C) (Temporal)   Resp 14   Ht 5' 5\" (1.651 m)   Wt 271 lb 3.2 oz (123 kg)   SpO2 98%   BMI 45.13 kg/m²        Review of Systems   Constitutional: Negative for fever. HENT:        No URI. No ear discharge. Respiratory: Negative for hemoptysis. Cardiovascular: Negative for chest pain. Gastrointestinal: Negative for blood in stool. Genitourinary: Negative for hematuria. Physical Exam  Constitutional:       General: She is not in acute distress. Appearance: She is obese. She is not toxic-appearing.    HENT:      Right Ear: Tympanic membrane, ear canal and external ear normal.      Left Ear: Tympanic membrane, ear canal and external ear normal.      Mouth/Throat:      Mouth: Mucous membranes are moist.      Pharynx: Oropharynx is clear. No oropharyngeal exudate or posterior oropharyngeal erythema. Eyes:      General: No scleral icterus. Conjunctiva/sclera: Conjunctivae normal.   Neck:      Comments: Normal carotid upstrokes bilaterally. Lymph: No cervical or supraclavicular lymphadenopathy. Cardiovascular:      Rate and Rhythm: Normal rate and regular rhythm. Heart sounds: No friction rub. No gallop. Pulmonary:      Effort: Pulmonary effort is normal.      Breath sounds: Normal breath sounds. Abdominal:      Tenderness: There is no abdominal tenderness. Musculoskeletal:      Cervical back: Neck supple. Comments: No clubbing, cyanosis, or edema. Skin:     General: Skin is warm and dry. Neurological:      Mental Status: She is alert and oriented to person, place, and time. Psychiatric:         Mood and Affect: Mood normal.     Total time spent on encounter, in minutes    Review of updated medical, surgical, family, social history with patient5  Review of systems5  Physical exam8  Review of med interactions between what she is taking, and Wellbutrin5  Counselingabout smoking, relative safety of Wellbutrin, risks of continued smoking, and importance of letting me know if her ear and/or throat pain worsen8  Completion of chart3    Total time: 35 minutes            Diagnoses and all orders for this visit:    1. Current every day smoker  Comments:  Reviewed drug interactions, none significant with her meds. Urged to try Wellbutrin. Reminded her nicotine is a drug with no benefits, only risks. Monitor BP    2. Acute pain of left ear  Comments:  Nl exam, advised to notify me if persists, throat pathology can radiate pain to the ear, important in a longtime smoker. She has had scopes by ENT         Follow-up and Dispositions    · Return in about 2 months (around 8/21/2021).               Arminda Hatfield MD

## 2021-08-20 ENCOUNTER — OFFICE VISIT (OUTPATIENT)
Dept: INTERNAL MEDICINE CLINIC | Age: 61
End: 2021-08-20
Payer: COMMERCIAL

## 2021-08-20 VITALS
DIASTOLIC BLOOD PRESSURE: 59 MMHG | HEIGHT: 65 IN | WEIGHT: 273.8 LBS | SYSTOLIC BLOOD PRESSURE: 129 MMHG | TEMPERATURE: 98.2 F | HEART RATE: 75 BPM | OXYGEN SATURATION: 98 % | BODY MASS INDEX: 45.62 KG/M2 | RESPIRATION RATE: 14 BRPM

## 2021-08-20 DIAGNOSIS — F17.200 CURRENT EVERY DAY SMOKER: Primary | ICD-10-CM

## 2021-08-20 DIAGNOSIS — I10 ESSENTIAL HYPERTENSION: ICD-10-CM

## 2021-08-20 PROCEDURE — 99214 OFFICE O/P EST MOD 30 MIN: CPT | Performed by: INTERNAL MEDICINE

## 2021-08-20 RX ORDER — BUPROPION HYDROCHLORIDE 150 MG/1
TABLET, EXTENDED RELEASE ORAL
Qty: 180 TABLET | Refills: 1 | Status: SHIPPED | OUTPATIENT
Start: 2021-08-20 | End: 2022-05-04

## 2021-08-20 NOTE — PROGRESS NOTES
HPI     Jessica Ramírez returns for follow-up after starting Wellbutrin last visit for smoking cessation. She is still smoking the same, does not have any less craving for cigarettes. She denies problems with the medication. She has been to physical therapy, including something called counter straining, which she thinks may have helped her tremor. Past Medical History:   Diagnosis Date    Abnormal Pap smear of cervix     Current every day smoker     Essential tremor     Fatty liver     Hyperlipidemia     Hypertension     Lumbosacral radiculopathy at L5     Morbid obesity (HCC)     Other ill-defined conditions(799.89)     benign essential tremor-dystonia    Restless legs     OMAR (stress urinary incontinence, female)     Unspecified sleep apnea     DOES NOT SLEEP WITH CPAP        Past Surgical History:   Procedure Laterality Date    ENDOSCOPY, COLON, DIAGNOSTIC      HX DILATION AND CURETTAGE      HX GYN      conezation    HX OTHER SURGICAL      COLONIZATION        Current Outpatient Medications   Medication Sig Dispense Refill    buPROPion SR (WELLBUTRIN SR) 150 mg SR tablet 1 po bid note new dose 180 Tablet 1    Myrbetriq 25 mg ER tablet       metaxalone (SKELAXIN) 800 mg tablet TAKE 1 TABLET BY MOUTH THREE TIMES DAILY AS NEEDED FOR MUSCLE SPASM      gabapentin (NEURONTIN) 300 mg capsule Take 300 mg by mouth five (5) times daily. Taking 2 tabs in the AM and 3 tabs in the PM.      fluticasone propionate (FLONASE) 50 mcg/actuation nasal spray 1 Spray daily.  prasterone, DHEA, (INTRAROSA VA) INSERT 1 VAGINAL INSERT BY VAGINAL ROUTE EVERY NIGHT AT BEDTIME      atorvastatin (LIPITOR) 80 mg tablet Take 1 Tab by mouth daily. 90 Tab 3    meclizine (ANTIVERT) 25 mg tablet Take 1 Tab by mouth four (4) times daily as needed for Dizziness. 360 Tab 3    spironolactone (ALDACTONE) 25 mg tablet Take 25 mg by mouth daily.  Indications: HYPERTENSION      ascorbic acid (VITAMIN C) 500 mg tablet Take 500 mg by mouth daily. Indications: VITAMIN C DEFICIENCY      aspirin 81 mg tablet Take 162 mg by mouth daily. Indications: MYOCARDIAL INFARCTION PREVENTION      ALPRAZolam (XANAX) 0.25 mg tablet Take 0.25 mg by mouth three (3) times daily as needed. Indications: ESSENTIAL TREMOR      calcium-cholecalciferol, D3, (CALCARB 600 WITH VITAMIN D) tablet Take 2 Tabs by mouth daily.  multivit-iron-FA-calcium &mins (CENTRAL-MELO WOMEN'S UNDER 50) 18 mg iron-400 mcg-500 mg Ca Tab Take 1 Cap by mouth daily.  co-enzyme Q-10 (CO Q-10) 100 mg capsule Take 100 mg by mouth daily.  lisinopril-hydrochlorothiazide (PRINZIDE, ZESTORETIC) 20-12.5 mg per tablet Take 1 Tab by mouth daily.  Omega-3 Fatty Acids-Fish Oil (OMEGA 3 FISH OIL) 684-1,200 mg CpDR Take 3 Caps by mouth daily.  trihexyphenidyl (ARTANE) 2 mg tablet Take 4 mg by mouth. Allergies   Allergen Reactions    Sunscreen Rash        Social History     Socioeconomic History    Marital status:      Spouse name: Not on file    Number of children: Not on file    Years of education: Not on file    Highest education level: Not on file   Occupational History    Not on file   Tobacco Use    Smoking status: Current Every Day Smoker     Packs/day: 1.00     Years: 41.00     Pack years: 41.00    Smokeless tobacco: Never Used   Substance and Sexual Activity    Alcohol use: Yes     Alcohol/week: 6.0 standard drinks     Types: 2 Glasses of wine, 4 Shots of liquor per week     Comment: 6 drinks weekly    Drug use: No    Sexual activity: Not on file   Other Topics Concern    Not on file   Social History Narrative    Not on file     Social Determinants of Health     Financial Resource Strain:     Difficulty of Paying Living Expenses:    Food Insecurity:     Worried About Running Out of Food in the Last Year:     Ran Out of Food in the Last Year:    Transportation Needs:     Lack of Transportation (Medical):      Lack of Transportation (Non-Medical):    Physical Activity:     Days of Exercise per Week:     Minutes of Exercise per Session:    Stress:     Feeling of Stress :    Social Connections:     Frequency of Communication with Friends and Family:     Frequency of Social Gatherings with Friends and Family:     Attends Mosque Services:     Active Member of Clubs or Organizations:     Attends Club or Organization Meetings:     Marital Status:    Intimate Partner Violence:     Fear of Current or Ex-Partner:     Emotionally Abused:     Physically Abused:     Sexually Abused:         Family History   Problem Relation Age of Onset    Hypertension Mother     Stroke Mother     Asthma Mother     Liver Disease Father         hepatitis,  at 52    Heart Disease Brother         MI at 52    Heart Disease Sister     MS Maternal Aunt     Stroke Paternal Grandmother     Heart Disease Paternal Grandfather     Osteoporosis Sister         osteopenia, VSD           Visit Vitals  BP (!) 129/59 (BP 1 Location: Left upper arm, BP Patient Position: Sitting)   Pulse 75   Temp 98.2 °F (36.8 °C) (Temporal)   Resp 14   Ht 5' 5\" (1.651 m)   Wt 273 lb 12.8 oz (124.2 kg)   SpO2 98%   BMI 45.56 kg/m²        Review of Systems   Eyes: Negative for blurred vision. Respiratory: Negative for cough, hemoptysis and shortness of breath. Cardiovascular: Negative for chest pain. Gastrointestinal: Negative for blood in stool. Genitourinary: Negative for hematuria. Neurological: Negative for headaches. Psychiatric/Behavioral: Negative for depression. The patient is not nervous/anxious. Physical Exam  Constitutional:       Appearance: She is obese. Eyes:      General: No scleral icterus. Conjunctiva/sclera: Conjunctivae normal.   Neck:      Comments: Normal bilateral carotid upstrokes. Cardiovascular:      Rate and Rhythm: Normal rate and regular rhythm. Heart sounds: No friction rub. No gallop.     Pulmonary: Effort: Pulmonary effort is normal.      Breath sounds: Normal breath sounds. Abdominal:      Palpations: Abdomen is soft. Tenderness: There is no abdominal tenderness. Musculoskeletal:      Cervical back: Neck supple. Comments: No clubbing, cyanosis, or edema. Skin:     General: Skin is warm and dry. Neurological:      Mental Status: She is oriented to person, place, and time. Comments: (+) head tremor   Psychiatric:         Mood and Affect: Mood normal.                  Diagnoses and all orders for this visit:    1. Current every day smoker  Comments:  No decrease in smoking on current dose Wellbutrin. Try increasing to 150 mg every morning, 100 mg nightly for the next few weeks, then 150 mg twice daily  Orders:  -     buPROPion SR (WELLBUTRIN SR) 150 mg SR tablet; 1 po bid note new dose    2. Essential hypertension  Comments:  Controlled, discussed at last visit that Wellbutrin can sometimes raise blood pressure, continue monitoring at home, call if significant rise. Follow-up and Dispositions    · Return in about 2 months (around 10/20/2021) for 15.               Bogdanlinn Arzate MD

## 2021-10-22 ENCOUNTER — TRANSCRIBE ORDER (OUTPATIENT)
Dept: SCHEDULING | Age: 61
End: 2021-10-22

## 2021-10-22 ENCOUNTER — OFFICE VISIT (OUTPATIENT)
Dept: INTERNAL MEDICINE CLINIC | Age: 61
End: 2021-10-22
Payer: COMMERCIAL

## 2021-10-22 VITALS
DIASTOLIC BLOOD PRESSURE: 80 MMHG | HEIGHT: 65 IN | OXYGEN SATURATION: 96 % | RESPIRATION RATE: 18 BRPM | TEMPERATURE: 97.3 F | BODY MASS INDEX: 44.98 KG/M2 | SYSTOLIC BLOOD PRESSURE: 133 MMHG | WEIGHT: 270 LBS

## 2021-10-22 DIAGNOSIS — E78.2 MIXED HYPERLIPIDEMIA: ICD-10-CM

## 2021-10-22 DIAGNOSIS — I10 ESSENTIAL HYPERTENSION: ICD-10-CM

## 2021-10-22 DIAGNOSIS — F17.200 CURRENT EVERY DAY SMOKER: Primary | ICD-10-CM

## 2021-10-22 DIAGNOSIS — Z12.31 VISIT FOR SCREENING MAMMOGRAM: Primary | ICD-10-CM

## 2021-10-22 DIAGNOSIS — Z87.891 PERSONAL HISTORY OF TOBACCO USE, PRESENTING HAZARDS TO HEALTH: ICD-10-CM

## 2021-10-22 PROCEDURE — 99213 OFFICE O/P EST LOW 20 MIN: CPT | Performed by: INTERNAL MEDICINE

## 2021-10-22 PROCEDURE — G0296 VISIT TO DETERM LDCT ELIG: HCPCS | Performed by: INTERNAL MEDICINE

## 2021-10-22 RX ORDER — LANOLIN ALCOHOL/MO/W.PET/CERES
CREAM (GRAM) TOPICAL DAILY
COMMUNITY

## 2021-10-22 NOTE — PROGRESS NOTES
Tina Mahan female 57 y.o.       1. Have you been to the ER, urgent care clinic since your last visit? Hospitalized since your last visit? No    2. Have you seen or consulted any other health care providers outside of the [address] [place] [address] since your last visit? Include any pap smears or colon screening.  No

## 2021-10-22 NOTE — PROGRESS NOTES
HPI     Ila Hays is here to follow-up on her smoking after increased dose of Wellbutrin. She reports the Wellbutrin made her feel very depressed, and she stopped it last week. She feels less depressed now. She continues to smoke about a pack a day. She reports problems with Chantix in the past.  She has purchased some nicotine lozenges. Her last low-dose CT of lungs was September 2020. Past Medical History:   Diagnosis Date    Abnormal Pap smear of cervix     Current every day smoker     Essential tremor     Fatty liver     Hyperlipidemia     Hypertension     Lumbosacral radiculopathy at L5     Morbid obesity (HCC)     Other ill-defined conditions(799.89)     benign essential tremor-dystonia    Restless legs     OMAR (stress urinary incontinence, female)     Unspecified sleep apnea     DOES NOT SLEEP WITH CPAP        Past Surgical History:   Procedure Laterality Date    ENDOSCOPY, COLON, DIAGNOSTIC      HX DILATION AND CURETTAGE      HX GYN      conezation    HX OTHER SURGICAL      COLONIZATION        Current Outpatient Medications   Medication Sig Dispense Refill    L-Mfolate-B6 Phos-Methyl-B12 (METANX) 3-35-2 mg tab tab Take 1 Tablet by mouth two (2) times a day.  vitamin E, dl,tocopheryl acet, (vitamin E acetate) 180 mg (400 unit) capsule Take  by mouth daily.  Myrbetriq 25 mg ER tablet       metaxalone (SKELAXIN) 800 mg tablet TAKE 1 TABLET BY MOUTH THREE TIMES DAILY AS NEEDED FOR MUSCLE SPASM      gabapentin (NEURONTIN) 300 mg capsule Take 300 mg by mouth five (5) times daily. Taking 2 tabs in the AM and 3 tabs in the PM.      fluticasone propionate (FLONASE) 50 mcg/actuation nasal spray 1 Spray daily.  prasterone, DHEA, (INTRAROSA VA) INSERT 1 VAGINAL INSERT BY VAGINAL ROUTE EVERY NIGHT AT BEDTIME      atorvastatin (LIPITOR) 80 mg tablet Take 1 Tab by mouth daily.  90 Tab 3    meclizine (ANTIVERT) 25 mg tablet Take 1 Tab by mouth four (4) times daily as needed for Dizziness. 360 Tab 3    spironolactone (ALDACTONE) 25 mg tablet Take 25 mg by mouth daily. Indications: HYPERTENSION      ascorbic acid (VITAMIN C) 500 mg tablet Take 500 mg by mouth daily. Indications: VITAMIN C DEFICIENCY      aspirin 81 mg tablet Take 162 mg by mouth daily. Indications: MYOCARDIAL INFARCTION PREVENTION      ALPRAZolam (XANAX) 0.25 mg tablet Take 0.25 mg by mouth three (3) times daily as needed. Indications: ESSENTIAL TREMOR      calcium-cholecalciferol, D3, (CALCARB 600 WITH VITAMIN D) tablet Take 2 Tabs by mouth daily.  multivit-iron-FA-calcium &mins (CENTRAL-MELO WOMEN'S UNDER 50) 18 mg iron-400 mcg-500 mg Ca Tab Take 1 Cap by mouth daily.  co-enzyme Q-10 (CO Q-10) 100 mg capsule Take 100 mg by mouth daily.  lisinopril-hydrochlorothiazide (PRINZIDE, ZESTORETIC) 20-12.5 mg per tablet Take 1 Tab by mouth daily.  Omega-3 Fatty Acids-Fish Oil (OMEGA 3 FISH OIL) 684-1,200 mg CpDR Take 3 Caps by mouth daily.  trihexyphenidyl (ARTANE) 2 mg tablet Take 4 mg by mouth.  buPROPion SR (WELLBUTRIN SR) 150 mg SR tablet 1 po bid note new dose (Patient not taking: Reported on 10/22/2021) 180 Tablet 1        Allergies   Allergen Reactions    Sunscreen Rash        Social History     Socioeconomic History    Marital status:      Spouse name: Not on file    Number of children: Not on file    Years of education: Not on file    Highest education level: Not on file   Occupational History    Not on file   Tobacco Use    Smoking status: Current Every Day Smoker     Packs/day: 1.00     Years: 41.00     Pack years: 41.00    Smokeless tobacco: Never Used   Substance and Sexual Activity    Alcohol use:  Yes     Alcohol/week: 6.0 standard drinks     Types: 2 Glasses of wine, 4 Shots of liquor per week     Comment: 6 drinks weekly    Drug use: No    Sexual activity: Not on file   Other Topics Concern    Not on file   Social History Narrative    Not on file Social Determinants of Health     Financial Resource Strain:     Difficulty of Paying Living Expenses:    Food Insecurity:     Worried About Running Out of Food in the Last Year:     [address] in the Last Year:    Transportation Needs:     Lack of Transportation (Medical):  Lack of Transportation (Non-Medical):    Physical Activity:     Days of Exercise per Week:     Minutes of Exercise per Session:    Stress:     Feeling of Stress :    Social Connections:     Frequency of Communication with Friends and Family:     Frequency of Social Gatherings with Friends and Family:     Attends Temple Services:     Active Member of Clubs or Organizations:     Attends Club or Organization Meetings:     Marital Status:    Intimate Partner Violence:     Fear of Current or Ex-Partner:     Emotionally Abused:     Physically Abused:     Sexually Abused:         Family History   Problem Relation Age of Onset    Hypertension Mother     Stroke Mother     Asthma Mother     Liver Disease Father         hepatitis,  at 52    Heart Disease Brother         MI at 52    Heart Disease Sister     MS Maternal Aunt     Stroke Paternal Grandmother     Heart Disease Paternal Grandfather     Osteoporosis Sister         osteopenia, VSD           Visit Vitals  /80 (BP 1 Location: Right arm, BP Patient Position: Sitting, BP Cuff Size: Adult)   Temp 97.3 °F (36.3 °C) (Temporal)   Resp 18   Ht 5' 5\" (1.651 m)   Wt 270 lb (122.5 kg)   SpO2 96%   BMI 44.93 kg/m²        Review of Systems   Respiratory: Negative for cough, hemoptysis and shortness of breath. Cardiovascular: Negative for chest pain. Gastrointestinal: Negative for blood in stool. Genitourinary: Negative for hematuria. Psychiatric/Behavioral: Negative for depression. Physical Exam  Constitutional:       General: She is not in acute distress. Appearance: She is obese. Eyes:      General: No scleral icterus. Conjunctiva/sclera: Conjunctivae normal.   Neck:      Comments: No cervical or supraclavicular lymphadenopathy. Cardiovascular:      Rate and Rhythm: Normal rate and regular rhythm. Pulses: Normal pulses. Heart sounds: No murmur heard. No friction rub. No gallop. Pulmonary:      Effort: Pulmonary effort is normal.      Breath sounds: Normal breath sounds. Abdominal:      General: Abdomen is flat. Palpations: Abdomen is soft. Tenderness: There is no abdominal tenderness. Musculoskeletal:      Cervical back: Neck supple. Comments: No clubbing, cyanosis, or edema. Skin:     General: Skin is warm and dry. Neurological:      Mental Status: She is alert and oriented to person, place, and time. Comments: Positive for head tremor   Psychiatric:         Mood and Affect: Mood normal.                  Diagnoses and all orders for this visit:    1. Current every day smoker  Comments:  Strongly urged to quit. Reports intolerance to Wellbutrin and Chantix. Has purchased nicotine lozenges. Order low-dose CT of lungs    2. Mixed hyperlipidemia  Comments:  Fasting panel before next visit. Orders:  -     LIPID PANEL; Future    3. Essential hypertension  Comments:  Controlled, continue meds. Pertinent labs before next visit. Orders:  -     METABOLIC PANEL, COMPREHENSIVE; Future    4. Personal history of tobacco use, presenting hazards to health  -     CT LOW DOSE LUNG CANCER SCREENING; Future         Follow-up and Dispositions    · Return in about 6 months (around 4/22/2022) for 30 minutes-- physical, labs 1 or 2 wks before. Maya Olmos MD   Discussed with patient current guidelines for screening for lung cancer. Current recommendations are to obtain yearly screening LDCT yearly for age 46-80, or until smoke free for 15 years. Patient has 41 pack year history of cigarette smoking and currently smokes.   Discussed with patient risks and benefits of screening, including over-diagnosis, false positive rate, and total radiation exposure. Patient currently exhibits no signs or symptoms suggestive of lung cancer. Discussed with patient importance of compliance with yearly annual lung cancer screenings and willingness to undergo diagnosis and treatment if screening scan is positive. In addition, patient was counseled regarding (remaining smoke free/total smoking cessation).

## 2021-10-25 ENCOUNTER — TRANSCRIBE ORDER (OUTPATIENT)
Dept: SCHEDULING | Age: 61
End: 2021-10-25

## 2021-10-25 DIAGNOSIS — Z13.820 OSTEOPOROSIS SCREENING: Primary | ICD-10-CM

## 2021-11-04 ENCOUNTER — HOSPITAL ENCOUNTER (OUTPATIENT)
Dept: MAMMOGRAPHY | Age: 61
Discharge: HOME | End: 2021-11-04
Attending: INTERNAL MEDICINE
Payer: COMMERCIAL

## 2021-11-04 DIAGNOSIS — Z12.31 VISIT FOR SCREENING MAMMOGRAM: ICD-10-CM

## 2021-11-04 PROCEDURE — 77067 SCR MAMMO BI INCL CAD: CPT

## 2021-11-05 DIAGNOSIS — Z87.891 PERSONAL HISTORY OF TOBACCO USE, PRESENTING HAZARDS TO HEALTH: ICD-10-CM

## 2021-12-16 ENCOUNTER — NURSE NAVIGATOR (OUTPATIENT)
Dept: OTHER | Age: 61
End: 2021-12-16

## 2021-12-16 NOTE — PROGRESS NOTES
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 Patient meets LDCT lung cancer screening criteria.        Jessica Fields BSN, RN, OCN  Lung Health Nurse Navigator

## 2021-12-20 ENCOUNTER — HOSPITAL ENCOUNTER (OUTPATIENT)
Dept: BONE DENSITY | Age: 61
Discharge: HOME | End: 2021-12-20
Attending: OBSTETRICS & GYNECOLOGY
Payer: COMMERCIAL

## 2021-12-20 ENCOUNTER — HOSPITAL ENCOUNTER (OUTPATIENT)
Dept: CT IMAGING | Age: 61
Discharge: HOME | End: 2021-12-20
Attending: INTERNAL MEDICINE
Payer: COMMERCIAL

## 2021-12-20 VITALS — HEIGHT: 65 IN | BODY MASS INDEX: 45.82 KG/M2 | WEIGHT: 275 LBS

## 2021-12-20 DIAGNOSIS — Z13.820 OSTEOPOROSIS SCREENING: ICD-10-CM

## 2021-12-20 PROCEDURE — 77080 DXA BONE DENSITY AXIAL: CPT

## 2021-12-20 PROCEDURE — 71271 CT THORAX LUNG CANCER SCR C-: CPT

## 2021-12-21 ENCOUNTER — TELEPHONE (OUTPATIENT)
Dept: INTERNAL MEDICINE CLINIC | Age: 61
End: 2021-12-21

## 2021-12-21 NOTE — TELEPHONE ENCOUNTER
CT scan of lungs shows a few benign-appearing scars and tiny nodules. The radiologist recommends another low-dose CT scan of her lungs in 1 year. Other findings include some calcium in her mitral valve, and some calcifications in her coronary arteries, means some coronary artery disease, another reason that she should quit smoking . Otherwise benign findings.

## 2022-04-05 ENCOUNTER — PATIENT MESSAGE (OUTPATIENT)
Dept: INTERNAL MEDICINE CLINIC | Age: 62
End: 2022-04-05

## 2022-04-05 DIAGNOSIS — R42 DIZZINESS: ICD-10-CM

## 2022-04-05 NOTE — TELEPHONE ENCOUNTER
Last Visit: 10/22/21 with MD Amanda Thorpe  Next Appointment: 4/29/22 with MD Amanda Thorpe  Previous Refill Encounter(s): 4/19/21 Antivert #360 with 3 refills    Requested Prescriptions     Pending Prescriptions Disp Refills    meclizine (ANTIVERT) 25 mg tablet 360 Tablet 3     Sig: Take 1 Tablet by mouth four (4) times daily as needed for Dizziness.  lisinopriL (PRINIVIL, ZESTRIL) 10 mg tablet 90 Tablet 3     Sig: Take 1 Tablet by mouth daily.        From outside meds tab:

## 2022-04-05 NOTE — TELEPHONE ENCOUNTER
From: Olga Bell  To: Lucero Huizar MD  Sent: 4/5/2022 10:57 AM EDT  Subject: Lisinopril 10 MG Tablets    Need refill. Pharmacy, [facility] on [facility] [phone], says you need to send new prescription to get refill. They can not send it to you without you sending update of change of contact info. I am about out. I can not find this medication on my chart. Not sure if I deleted by mistake.  Thank you Jasmyne Camp

## 2022-04-07 RX ORDER — LISINOPRIL 10 MG/1
10 TABLET ORAL DAILY
Qty: 90 TABLET | Refills: 3 | Status: SHIPPED | OUTPATIENT
Start: 2022-04-07

## 2022-04-07 RX ORDER — MECLIZINE HYDROCHLORIDE 25 MG/1
25 TABLET ORAL
Qty: 360 TABLET | Refills: 3 | Status: SHIPPED | OUTPATIENT
Start: 2022-04-07

## 2022-04-22 ENCOUNTER — APPOINTMENT (OUTPATIENT)
Dept: INTERNAL MEDICINE CLINIC | Age: 62
End: 2022-04-22

## 2022-04-22 ENCOUNTER — HOSPITAL ENCOUNTER (OUTPATIENT)
Dept: LAB | Age: 62
Discharge: HOME | End: 2022-04-22
Payer: COMMERCIAL

## 2022-04-22 DIAGNOSIS — I10 ESSENTIAL HYPERTENSION: ICD-10-CM

## 2022-04-22 DIAGNOSIS — E78.2 MIXED HYPERLIPIDEMIA: ICD-10-CM

## 2022-04-22 LAB
ALBUMIN SERPL-MCNC: 3.7 G/DL (ref 3.4–5)
ALBUMIN/GLOB SERPL: 1.1 {RATIO} (ref 0.8–1.7)
ALP SERPL-CCNC: 110 U/L (ref 45–117)
ALT SERPL-CCNC: 52 U/L (ref 13–56)
ANION GAP SERPL CALC-SCNC: 7 MMOL/L (ref 3–18)
AST SERPL-CCNC: 36 U/L (ref 10–38)
BILIRUB SERPL-MCNC: 0.4 MG/DL (ref 0.2–1)
BUN SERPL-MCNC: 13 MG/DL (ref 7–18)
BUN/CREAT SERPL: 16 (ref 12–20)
CALCIUM SERPL-MCNC: 9.7 MG/DL (ref 8.5–10.1)
CHLORIDE SERPL-SCNC: 103 MMOL/L (ref 100–111)
CHOLEST SERPL-MCNC: 164 MG/DL
CO2 SERPL-SCNC: 30 MMOL/L (ref 21–32)
CREAT SERPL-MCNC: 0.83 MG/DL (ref 0.6–1.3)
GLOBULIN SER CALC-MCNC: 3.4 G/DL (ref 2–4)
GLUCOSE SERPL-MCNC: 93 MG/DL (ref 74–99)
HDLC SERPL-MCNC: 51 MG/DL (ref 40–60)
HDLC SERPL: 3.2 {RATIO} (ref 0–5)
LDLC SERPL CALC-MCNC: 89.6 MG/DL (ref 0–100)
LIPID PROFILE,FLP: NORMAL
POTASSIUM SERPL-SCNC: 4.3 MMOL/L (ref 3.5–5.5)
PROT SERPL-MCNC: 7.1 G/DL (ref 6.4–8.2)
SODIUM SERPL-SCNC: 140 MMOL/L (ref 136–145)
TRIGL SERPL-MCNC: 117 MG/DL (ref ?–150)
VLDLC SERPL CALC-MCNC: 23.4 MG/DL

## 2022-04-22 PROCEDURE — 36415 COLL VENOUS BLD VENIPUNCTURE: CPT

## 2022-04-22 PROCEDURE — 80061 LIPID PANEL: CPT

## 2022-04-22 PROCEDURE — 80053 COMPREHEN METABOLIC PANEL: CPT

## 2022-04-29 ENCOUNTER — OFFICE VISIT (OUTPATIENT)
Dept: INTERNAL MEDICINE CLINIC | Age: 62
End: 2022-04-29
Payer: COMMERCIAL

## 2022-04-29 VITALS
DIASTOLIC BLOOD PRESSURE: 75 MMHG | OXYGEN SATURATION: 98 % | WEIGHT: 270.2 LBS | RESPIRATION RATE: 18 BRPM | HEART RATE: 64 BPM | SYSTOLIC BLOOD PRESSURE: 126 MMHG | BODY MASS INDEX: 45.02 KG/M2 | HEIGHT: 65 IN | TEMPERATURE: 97.2 F

## 2022-04-29 DIAGNOSIS — R25.1 TREMOR: ICD-10-CM

## 2022-04-29 DIAGNOSIS — E78.2 MIXED HYPERLIPIDEMIA: ICD-10-CM

## 2022-04-29 DIAGNOSIS — R63.4 WEIGHT LOSS: ICD-10-CM

## 2022-04-29 DIAGNOSIS — F17.200 CURRENT EVERY DAY SMOKER: ICD-10-CM

## 2022-04-29 DIAGNOSIS — I10 ESSENTIAL HYPERTENSION: ICD-10-CM

## 2022-04-29 DIAGNOSIS — Z00.00 ROUTINE PHYSICAL EXAMINATION: Primary | ICD-10-CM

## 2022-04-29 PROCEDURE — 99396 PREV VISIT EST AGE 40-64: CPT | Performed by: INTERNAL MEDICINE

## 2022-04-29 RX ORDER — POLYETHYLENE GLYCOL 3350 17 G/17G
17 POWDER, FOR SOLUTION ORAL DAILY
COMMUNITY

## 2022-04-29 RX ORDER — ROSUVASTATIN CALCIUM 40 MG/1
40 TABLET, COATED ORAL DAILY
Qty: 90 TABLET | Refills: 1 | Status: SHIPPED | OUTPATIENT
Start: 2022-04-29 | End: 2022-08-05 | Stop reason: SDUPTHER

## 2022-04-29 RX ORDER — VIBEGRON 75 MG/1
TABLET, FILM COATED ORAL
COMMUNITY

## 2022-04-29 NOTE — PROGRESS NOTES
Jimmie Bautista presents today for   Chief Complaint   Patient presents with    Other     6 month F/U       Is someone accompanying this pt? no    Is the patient using any DME equipment during OV? no    Depression Screening:  3 most recent PHQ Screens 4/29/2022   Little interest or pleasure in doing things Not at all   Feeling down, depressed, irritable, or hopeless Not at all   Total Score PHQ 2 0       Learning Assessment:  No flowsheet data found. Abuse Screening:  Abuse Screening Questionnaire 4/29/2022   Do you ever feel afraid of your partner? N   Are you in a relationship with someone who physically or mentally threatens you? N   Is it safe for you to go home? Y       Fall Risk  No flowsheet data found. ADL  ADL Assessment 4/19/2021   Feeding yourself No Help Needed   Getting from bed to chair No Help Needed   Getting dressed No Help Needed   Bathing or showering No Help Needed   Walk across the room (includes cane/walker) Help Needed   Using the telphone No Help Needed   Taking your medications No Help Needed   Preparing meals No Help Needed   Managing money (expenses/bills) No Help Needed   Moderately strenuous housework (laundry) Help Needed   Shopping for personal items (toiletries/medicines) Help Needed   Shopping for groceries Help Needed   Driving No Help Needed   Climbing a flight of stairs Help Needed   Getting to places beyond walking distances Help Needed       Health Maintenance reviewed and discussed and ordered per Provider. Health Maintenance Due   Topic Date Due    Hepatitis C Screening  Never done    Pneumococcal 0-64 years (1 - PCV) Never done    DTaP/Tdap/Td series (1 - Tdap) Never done    Shingrix Vaccine Age 50> (1 of 2) Never done    Cervical cancer screen  10/23/2020    COVID-19 Vaccine (3 - Booster for Moderna series) 08/06/2021    Depression Screen  04/19/2022   .    1. \"Have you been to the ER, urgent care clinic since your last visit?   Hospitalized since your last visit? \" No    2. \"Have you seen or consulted any other health care providers outside of the [address] [place] [address] since your last visit? \" No     3. For patients aged 39-70: Has the patient had a colonoscopy / FIT/ Cologuard? Yes - no Care Gap present      If the patient is female:    4. For patients aged 41-77: Has the patient had a mammogram within the past 2 years? Yes - no Care Gap present      5. For patients aged 21-65: Has the patient had a pap smear?  No

## 2022-05-04 NOTE — PROGRESS NOTES
HPI     Laura Ca is here for routine physical exam.  She feels her tremors have improved; she is taking medication from her neurologist, and is also seeing a provider in [place] who does adjustments. She reports no new family history. She admits to not taking her atorvastatin every day, may take it every other day. She denies problems with it, and after reviewing her labs, I asked her to take it daily. Past Medical History:   Diagnosis Date    Abnormal Pap smear of cervix     Current every day smoker     Essential tremor     Fatty liver     Hyperlipidemia     Hypertension     Lumbosacral radiculopathy at L5     Morbid obesity (HCC)     Other ill-defined conditions(799.89)     benign essential tremor-dystonia    Restless legs     OMAR (stress urinary incontinence, female)     Unspecified sleep apnea     DOES NOT SLEEP WITH CPAP        Past Surgical History:   Procedure Laterality Date    ENDOSCOPY, COLON, DIAGNOSTIC      HX DILATION AND CURETTAGE      HX GYN      conezation    HX OTHER SURGICAL      COLONIZATION        Current Outpatient Medications   Medication Sig Dispense Refill    vibegron (Gemtesa) 75 mg tab Take  by mouth.  polyethylene glycol (Miralax) 17 gram/dose powder Take 17 g by mouth daily.  rosuvastatin (CRESTOR) 40 mg tablet Take 1 Tablet by mouth daily. 90 Tablet 1    meclizine (ANTIVERT) 25 mg tablet Take 1 Tablet by mouth four (4) times daily as needed for Dizziness. 360 Tablet 3    lisinopriL (PRINIVIL, ZESTRIL) 10 mg tablet Take 1 Tablet by mouth daily. 90 Tablet 3    L-Mfolate-B6 Phos-Methyl-B12 (METANX) 3-35-2 mg tab tab Take 1 Tablet by mouth two (2) times a day.  vitamin E, dl,tocopheryl acet, (vitamin E acetate) 180 mg (400 unit) capsule Take  by mouth daily.       metaxalone (SKELAXIN) 800 mg tablet TAKE 1 TABLET BY MOUTH THREE TIMES DAILY AS NEEDED FOR MUSCLE SPASM      gabapentin (NEURONTIN) 300 mg capsule Take 300 mg by mouth five (5) times daily. Taking 2 tabs in the AM and 3 tabs in the PM.      fluticasone propionate (FLONASE) 50 mcg/actuation nasal spray 1 Spray daily.  prasterone, DHEA, (INTRAROSA VA) INSERT 1 VAGINAL INSERT BY VAGINAL ROUTE EVERY NIGHT AT BEDTIME      spironolactone (ALDACTONE) 25 mg tablet Take 25 mg by mouth daily. Indications: HYPERTENSION      aspirin 81 mg tablet Take 162 mg by mouth daily. Indications: MYOCARDIAL INFARCTION PREVENTION      ALPRAZolam (XANAX) 0.25 mg tablet Take 0.25 mg by mouth three (3) times daily as needed. Indications: ESSENTIAL TREMOR      calcium-cholecalciferol, D3, (CALCARB 600 WITH VITAMIN D) tablet Take 2 Tabs by mouth daily.  multivit-iron-FA-calcium &mins ([facility] UNDER 50) 18 mg iron-400 mcg-500 mg Ca Tab Take 1 Cap by mouth daily.  co-enzyme Q-10 (CO Q-10) 100 mg capsule Take 100 mg by mouth daily.  trihexyphenidyl (ARTANE) 2 mg tablet Take 4 mg by mouth. Allergies   Allergen Reactions    Sunscreen Rash        Social History     Socioeconomic History    Marital status:      Spouse name: Not on file    Number of children: Not on file    Years of education: Not on file    Highest education level: Not on file   Occupational History    Not on file   Tobacco Use    Smoking status: Current Every Day Smoker     Packs/day: 1.00     Years: 41.00     Pack years: 41.00    Smokeless tobacco: Never Used   Substance and Sexual Activity    Alcohol use:  Yes     Alcohol/week: 6.0 standard drinks     Types: 2 Glasses of wine, 4 Shots of liquor per week     Comment: 6 drinks weekly    Drug use: No    Sexual activity: Not on file   Other Topics Concern    Not on file   Social History Narrative    Not on file     Social Determinants of Health     Financial Resource Strain:     Difficulty of Paying Living Expenses: Not on file   Food Insecurity:     Worried About Running Out of Food in the Last Year: Not on file    [address] in the Last Year: Not on file   Transportation Needs:     Lack of Transportation (Medical): Not on file    Lack of Transportation (Non-Medical): Not on file   Physical Activity:     Days of Exercise per Week: Not on file    Minutes of Exercise per Session: Not on file   Stress:     Feeling of Stress : Not on file   Social Connections:     Frequency of Communication with Friends and Family: Not on file    Frequency of Social Gatherings with Friends and Family: Not on file    Attends Yazidism Services: Not on file    Active Member of [address] [place] [address] [employer] or Organizations: Not on file    Attends Club or Organization Meetings: Not on file    Marital Status: Not on file   Intimate Partner Violence:     Fear of Current or Ex-Partner: Not on file    Emotionally Abused: Not on file    Physically Abused: Not on file    Sexually Abused: Not on file   Housing Stability:     Unable to Pay for Housing in the Last Year: Not on file    Number of [place] in the Last Year: Not on file    Unstable Housing in the Last Year: Not on file        Family History   Problem Relation Age of Onset    Hypertension Mother     Stroke Mother     Asthma Mother     Liver Disease Father         hepatitis,  at 52    Heart Disease Brother         MI at 52    Heart Disease Sister     Mult Sclerosis Maternal Aunt     Stroke Paternal Grandmother     Heart Disease Paternal Grandfather     Osteoporosis Sister         osteopenia, VSD           Visit Vitals  /75   Pulse 64   Temp 97.2 °F (36.2 °C) (Temporal)   Resp 18   Ht 5' 5\" (1.651 m)   Wt 270 lb 3.2 oz (122.6 kg)   SpO2 98%   BMI 44.96 kg/m²        Review of Systems   Eyes: Negative for blurred vision. Respiratory: Negative for hemoptysis and shortness of breath. Cardiovascular: Negative for chest pain. Gastrointestinal: Negative for blood in stool. Genitourinary: Negative for hematuria. Neurological: Negative for headaches.    Psychiatric/Behavioral: Negative for depression. The patient is not nervous/anxious. Physical Exam  Constitutional:       General: She is not in acute distress. Appearance: She is obese. She is not ill-appearing. Comments: Weight down 5 pounds in 4 months   HENT:      Right Ear: Tympanic membrane, ear canal and external ear normal.      Left Ear: Tympanic membrane, ear canal and external ear normal.   Eyes:      General: No scleral icterus. Conjunctiva/sclera: Conjunctivae normal.   Neck:      Comments: Bilateral carotid upstrokes normal to palpation. Cardiovascular:      Rate and Rhythm: Normal rate and regular rhythm. Pulses: Normal pulses. Heart sounds: No friction rub. No gallop. Pulmonary:      Effort: Pulmonary effort is normal.      Breath sounds: Normal breath sounds. Abdominal:      Palpations: Abdomen is soft. Tenderness: There is no abdominal tenderness. Musculoskeletal:      Cervical back: Neck supple. Comments: No clubbing, cyanosis, or edema. Skin:     General: Skin is warm and dry. Neurological:      Mental Status: She is alert and oriented to person, place, and time. Psychiatric:         Mood and Affect: Mood normal.                  Diagnoses and all orders for this visit:    1. Routine physical examination  Comments:  Up-to-date colonoscopy, sees a GYN physician. 2. Weight loss  Comments:  Unintentional, check TSH with next labs. Orders:  -     TSH 3RD GENERATION; Future    3. Mixed hyperlipidemia  Comments:  LDL elevated, recommend statin daily, pertinent labs in 3 months  Orders:  -     rosuvastatin (CRESTOR) 40 mg tablet; Take 1 Tablet by mouth daily.  -     AST; Future  -     ALT; Future  -     CK; Future  -     LIPID PANEL; Future    4. Tremor  Comments:  Subjectively improved with treatment, both medication and manipulation. Orders:  -     TSH 3RD GENERATION; Future    5. Current every day smoker  Comments:  Urged to quit.   Will be due for low-dose CT screening toward the end of the year. 6. Essential hypertension  Comments:  Controlled, continue medication         Follow-up and Dispositions    · Return in about 6 months (around 10/29/2022) for routine check-- do labs in 3 months.               Eitan Green MD

## 2022-08-03 ENCOUNTER — HOSPITAL ENCOUNTER (OUTPATIENT)
Dept: LAB | Age: 62
Discharge: HOME | End: 2022-08-03
Payer: COMMERCIAL

## 2022-08-03 ENCOUNTER — APPOINTMENT (OUTPATIENT)
Dept: INTERNAL MEDICINE CLINIC | Age: 62
End: 2022-08-03

## 2022-08-03 DIAGNOSIS — R25.1 TREMOR: ICD-10-CM

## 2022-08-03 DIAGNOSIS — E78.2 MIXED HYPERLIPIDEMIA: ICD-10-CM

## 2022-08-03 DIAGNOSIS — R63.4 WEIGHT LOSS: ICD-10-CM

## 2022-08-03 LAB
ALT SERPL-CCNC: 41 U/L (ref 13–56)
AST SERPL-CCNC: 32 U/L (ref 10–38)
CHOLEST SERPL-MCNC: 144 MG/DL
CK SERPL-CCNC: 108 U/L (ref 26–192)
HDLC SERPL-MCNC: 47 MG/DL (ref 40–60)
HDLC SERPL: 3.1 {RATIO} (ref 0–5)
LDLC SERPL CALC-MCNC: 61.8 MG/DL (ref 0–100)
LIPID PROFILE,FLP: ABNORMAL
TRIGL SERPL-MCNC: 176 MG/DL (ref ?–150)
TSH SERPL DL<=0.05 MIU/L-ACNC: 1.63 UIU/ML (ref 0.36–3.74)
VLDLC SERPL CALC-MCNC: 35.2 MG/DL

## 2022-08-03 PROCEDURE — 36415 COLL VENOUS BLD VENIPUNCTURE: CPT

## 2022-08-03 PROCEDURE — 82550 ASSAY OF CK (CPK): CPT

## 2022-08-03 PROCEDURE — 84460 ALANINE AMINO (ALT) (SGPT): CPT

## 2022-08-03 PROCEDURE — 84443 ASSAY THYROID STIM HORMONE: CPT

## 2022-08-03 PROCEDURE — 80061 LIPID PANEL: CPT

## 2022-08-03 PROCEDURE — 84450 TRANSFERASE (AST) (SGOT): CPT

## 2022-08-05 RX ORDER — ROSUVASTATIN CALCIUM 40 MG/1
40 TABLET, COATED ORAL DAILY
Qty: 90 TABLET | Refills: 1 | Status: SHIPPED | OUTPATIENT
Start: 2022-08-05

## 2022-08-05 NOTE — TELEPHONE ENCOUNTER
Pt aware of message below and verbalized understanding. Patient needs refill of rosuvastatin- pended below. No further questions or concerns from pt at this time.

## 2022-09-08 ENCOUNTER — TRANSCRIBE ORDER (OUTPATIENT)
Dept: SCHEDULING | Age: 62
End: 2022-09-08

## 2022-09-08 DIAGNOSIS — M54.16 LUMBAR RADICULOPATHY: Primary | ICD-10-CM

## 2022-09-28 ENCOUNTER — HOSPITAL ENCOUNTER (OUTPATIENT)
Age: 62
Discharge: HOME | End: 2022-09-28
Attending: ORTHOPAEDIC SURGERY
Payer: COMMERCIAL

## 2022-09-28 DIAGNOSIS — M54.16 LUMBAR RADICULOPATHY: ICD-10-CM

## 2022-09-28 PROCEDURE — 72148 MRI LUMBAR SPINE W/O DYE: CPT

## 2022-11-01 ENCOUNTER — OFFICE VISIT (OUTPATIENT)
Dept: INTERNAL MEDICINE CLINIC | Age: 62
End: 2022-11-01
Payer: COMMERCIAL

## 2022-11-01 VITALS
HEART RATE: 93 BPM | WEIGHT: 261 LBS | HEIGHT: 65 IN | BODY MASS INDEX: 43.49 KG/M2 | DIASTOLIC BLOOD PRESSURE: 60 MMHG | OXYGEN SATURATION: 98 % | RESPIRATION RATE: 18 BRPM | TEMPERATURE: 97.3 F | SYSTOLIC BLOOD PRESSURE: 111 MMHG

## 2022-11-01 DIAGNOSIS — R29.2 BOTH ANKLE REFLEXES ABSENT: ICD-10-CM

## 2022-11-01 DIAGNOSIS — K76.0 FATTY LIVER: ICD-10-CM

## 2022-11-01 DIAGNOSIS — E66.01 OBESITY, CLASS III, BMI 40-49.9 (MORBID OBESITY) (HCC): ICD-10-CM

## 2022-11-01 DIAGNOSIS — Z11.59 SCREENING FOR VIRAL DISEASE: ICD-10-CM

## 2022-11-01 DIAGNOSIS — R63.4 WEIGHT LOSS: ICD-10-CM

## 2022-11-01 DIAGNOSIS — G95.19 NEUROGENIC CLAUDICATION (HCC): ICD-10-CM

## 2022-11-01 DIAGNOSIS — I10 ESSENTIAL HYPERTENSION: Primary | ICD-10-CM

## 2022-11-01 PROBLEM — R29.818 NEUROGENIC CLAUDICATION: Status: ACTIVE | Noted: 2022-11-01

## 2022-11-01 PROCEDURE — 3074F SYST BP LT 130 MM HG: CPT | Performed by: INTERNAL MEDICINE

## 2022-11-01 PROCEDURE — 99214 OFFICE O/P EST MOD 30 MIN: CPT | Performed by: INTERNAL MEDICINE

## 2022-11-01 PROCEDURE — 3078F DIAST BP <80 MM HG: CPT | Performed by: INTERNAL MEDICINE

## 2022-11-01 RX ORDER — DICLOFENAC POTASSIUM 50 MG/1
50 TABLET, FILM COATED ORAL 3 TIMES DAILY
COMMUNITY

## 2022-11-01 NOTE — PROGRESS NOTES
Francia Sweeney presents today for   Chief Complaint   Patient presents with    Follow Up Chronic Condition     Lab results       Is someone accompanying this pt? no    Is the patient using any DME equipment during OV? no    Depression Screening:  3 most recent PHQ Screens 11/1/2022   Little interest or pleasure in doing things Not at all   Feeling down, depressed, irritable, or hopeless Not at all   Total Score PHQ 2 0       Learning Assessment:  No flowsheet data found. Abuse Screening:  Abuse Screening Questionnaire 11/1/2022   Do you ever feel afraid of your partner? N   Are you in a relationship with someone who physically or mentally threatens you? N   Is it safe for you to go home? Y       Fall Risk  No flowsheet data found. ADL  ADL Assessment 4/19/2021   Feeding yourself No Help Needed   Getting from bed to chair No Help Needed   Getting dressed No Help Needed   Bathing or showering No Help Needed   Walk across the room (includes cane/walker) Help Needed   Using the telphone No Help Needed   Taking your medications No Help Needed   Preparing meals No Help Needed   Managing money (expenses/bills) No Help Needed   Moderately strenuous housework (laundry) Help Needed   Shopping for personal items (toiletries/medicines) Help Needed   Shopping for groceries Help Needed   Driving No Help Needed   Climbing a flight of stairs Help Needed   Getting to places beyond walking distances Help Needed       Health Maintenance reviewed and discussed and ordered per Provider. Health Maintenance Due   Topic Date Due    Hepatitis C Screening  Never done    Pneumococcal 0-64 years (1 - PCV) Never done    DTaP/Tdap/Td series (1 - Tdap) Never done    Shingrix Vaccine Age 50> (1 of 2) Never done    Cervical cancer screen  10/23/2020    COVID-19 Vaccine (3 - Booster for Moderna series) 05/01/2021    Flu Vaccine (1) Never done   . Coordination of Care:  1.  Have you been to the ER, urgent care clinic since your last visit? Hospitalized since your last visit? no    2. Have you seen or consulted any other health care providers outside of the [address] [place] [address] since your last visit? Include any pap smears or colon screening. no    3. For patients aged 39-70: Has the patient had a colonoscopy? Yes - no Care Gap present     If the patient is female:    4. For patients aged 41-77: Has the patient had a mammogram within the past 2 years? Yes - no Care Gap present    5. For patients aged 21-65: Has the patient had a pap smear?  No

## 2022-11-01 NOTE — PROGRESS NOTES
HPI s    Jeronimo Connor is here for routine follow-up of her hypertension. She had labs done in August, we reviewed again today. She has lost weight, reports she and her  are eating smaller portions, splitting 1 meal when they go out to eat, etc.  She is smoking about a pack per day. She is seeing a specialist in [place] for her tremor. She feels the tremor has gotten worse in the past few weeks. She only drinks 1 cup of coffee daily, more makes her tremor worse. She has some urinary incontinence, but is taking an oral medication which is helped, and is fine with wearing a pad  Occasional light leakage. Her legs do get fatigued when she tries to walk a distance. She recently had an epidural to her low back, and can now f feel sensation in her left foot  Past Medical History:   Diagnosis Date    Abnormal Pap smear of cervix     Current every day smoker     Essential tremor     Fatty liver     Hyperlipidemia     Hypertension     Lumbosacral radiculopathy at L5     Morbid obesity ([address]     Other ill-defined conditions(799.89)     benign essential tremor-dystonia    Restless legs     OMAR (stress urinary incontinence, female)     Unspecified sleep apnea     DOES NOT SLEEP WITH CPAP        Past Surgical History:   Procedure Laterality Date    ENDOSCOPY, COLON, DIAGNOSTIC      HX DILATION AND CURETTAGE      HX GYN      conezation    HX OTHER SURGICAL      COLONIZATION        Current Outpatient Medications   Medication Sig Dispense Refill    diclofenac potassium (CATAFLAM) 50 mg tablet Take 50 mg by mouth three (3) times daily. rosuvastatin (CRESTOR) 40 mg tablet Take 1 Tablet by mouth in the morning. 90 Tablet 1    vibegron (Gemtesa) 75 mg tab Take  by mouth.      polyethylene glycol (MIRALAX) 17 gram/dose powder Take 17 g by mouth daily. meclizine (ANTIVERT) 25 mg tablet Take 1 Tablet by mouth four (4) times daily as needed for Dizziness.  360 Tablet 3    lisinopriL (PRINIVIL, ZESTRIL) 10 mg tablet Take 1 Tablet by mouth daily. 90 Tablet 3    L-Mfolate-B6 Phos-Methyl-B12 (METANX) 3-35-2 mg tab tab Take 1 Tablet by mouth two (2) times a day. vitamin E, dl,tocopheryl acet, (vitamin E acetate) 180 mg (400 unit) capsule Take  by mouth daily. metaxalone (SKELAXIN) 800 mg tablet TAKE 1 TABLET BY MOUTH THREE TIMES DAILY AS NEEDED FOR MUSCLE SPASM      gabapentin (NEURONTIN) 300 mg capsule Take 300 mg by mouth five (5) times daily. Taking 2 tabs in the AM and 3 tabs in the PM.      fluticasone propionate (FLONASE) 50 mcg/actuation nasal spray 1 Spray daily. prasterone, DHEA, (INTRAROSA VA) INSERT 1 VAGINAL INSERT BY VAGINAL ROUTE EVERY NIGHT AT BEDTIME      spironolactone (ALDACTONE) 25 mg tablet Take 25 mg by mouth daily. Indications: HYPERTENSION      aspirin 81 mg tablet Take 162 mg by mouth daily. Indications: MYOCARDIAL INFARCTION PREVENTION      ALPRAZolam (XANAX) 0.25 mg tablet Take 0.25 mg by mouth three (3) times daily as needed. Indications: ESSENTIAL TREMOR      calcium-cholecalciferol, D3, (CALTRATE 600+D) tablet Take 2 Tabs by mouth daily. multivit-iron-FA-calcium-mins 18 mg iron-400 mcg-500 mg Ca tab Take 1 Cap by mouth daily. co-enzyme Q-10 (CO Q-10) 100 mg capsule Take 100 mg by mouth daily. trihexyphenidyl (ARTANE) 2 mg tablet Take 4 mg by mouth. Allergies   Allergen Reactions    Sunscreen Rash        Social History     Socioeconomic History    Marital status:      Spouse name: Not on file    Number of children: Not on file    Years of education: Not on file    Highest education level: Not on file   Occupational History    Not on file   Tobacco Use    Smoking status: Every Day     Packs/day: 1.00     Years: 41.00     Pack years: 41.00     Types: Cigarettes    Smokeless tobacco: Never   Substance and Sexual Activity    Alcohol use:  Yes     Alcohol/week: 6.0 standard drinks     Types: 2 Glasses of wine, 4 Shots of liquor per week     Comment: 6 drinks weekly    Drug use: No    Sexual activity: Not on file   Other Topics Concern    Not on file   Social History Narrative    Not on file     Social Determinants of Health     Financial Resource Strain: Not on file   Food Insecurity: Not on file   Transportation Needs: Not on file   Physical Activity: Not on file   Stress: Not on file   Social Connections: Not on file   Intimate Partner Violence: Not on file   Housing Stability: Not on file        Family History   Problem Relation Age of Onset    Hypertension Mother     Stroke Mother     Asthma Mother     Liver Disease Father         hepatitis,  at 52    Heart Disease Brother         MI at 52    Heart Disease Sister     Mult Sclerosis Maternal Aunt     Stroke Paternal Grandmother     Heart Disease Paternal Grandfather     Osteoporosis Sister         osteopenia, VSD           Visit Vitals  /60   Pulse 93   Temp 97.3 °F (36.3 °C) (Temporal)   Resp 18   Ht 5' 5\" (1.651 m)   Wt 261 lb (118.4 kg)   SpO2 98%   BMI 43.43 kg/m²        Review of Systems   Respiratory:  Negative for shortness of breath. Cardiovascular:  Negative for chest pain. Gastrointestinal:  Negative for blood in stool. No loss of appetite, dysgeusia, or dysphagia   Genitourinary:  Negative for hematuria. Psychiatric/Behavioral:  Negative for depression. The patient is not nervous/anxious. Physical Exam  Constitutional:       General: She is not in acute distress. Appearance: She is obese. Comments: Weight down 9 pounds since last visit 6 months ago. Eyes:      General: No scleral icterus. Conjunctiva/sclera: Conjunctivae normal.   Neck:      Comments: Carotid upstrokes normal to palpation. Lymph: No cervical, supraclavicular, or axillary lymphadenopathy. Cardiovascular:      Rate and Rhythm: Normal rate and regular rhythm. Pulses: Normal pulses. Heart sounds: No friction rub. No gallop.    Pulmonary:      Effort: Pulmonary effort is normal. Breath sounds: Normal breath sounds. Abdominal:      Palpations: Abdomen is soft. Tenderness: There is no abdominal tenderness. Musculoskeletal:      Cervical back: Neck supple. Comments: No clubbing, cyanosis, or edema. Normal leg strength. Calves nontender, no cords. Skin:     General: Skin is warm and dry. Neurological:      Mental Status: She is alert and oriented to person, place, and time. Comments: Absent bilateral ankle reflexes   Psychiatric:         Mood and Affect: Mood normal.                Diagnoses and all orders for this visit:    1. Essential hypertension  Comments:  Controlled, continue medications, pertinent labs shortly before next visit  Orders:  -     METABOLIC PANEL, COMPREHENSIVE; Future    2. Obesity, Class III, BMI 40-49.9 (morbid obesity) ([facility] [address]  Comments:  Has lost weight with portion control, no alarm symptoms. mOntior    3. Weight loss  Comments:  Reports due  to portion control, TSH with next labs. Orders:  -     TSH 3RD GENERATION; Future    4. Screening for viral disease  Comments:  Hep C with next labs  Orders:  -     HEPATITIS C AB; Future    5. Fatty liver  Comments:  Carlson FibroSure with next labs, discussed weight loss is the cornerstone of treatment  Orders:  -     CARLSON FIBROSURE; Future    6. Both ankle reflexes absent  Comments:  Discussed this is likely due to to \"pinched nerves\" in back. 7. Neurogenic claudication ([facility] [address]  Comments:  Sees spine center, recent epidural helped regain sensation in left foot         Follow-up and Dispositions    Return in about 6 months (around 5/1/2023) for physical-- labs week before.               Cha Varela MD

## 2022-11-17 ENCOUNTER — TRANSCRIBE ORDER (OUTPATIENT)
Dept: SCHEDULING | Age: 62
End: 2022-11-17

## 2022-11-17 DIAGNOSIS — Z12.31 VISIT FOR SCREENING MAMMOGRAM: Primary | ICD-10-CM

## 2022-12-13 ENCOUNTER — HOSPITAL ENCOUNTER (OUTPATIENT)
Dept: MAMMOGRAPHY | Age: 62
Discharge: HOME | End: 2022-12-13
Attending: INTERNAL MEDICINE
Payer: COMMERCIAL

## 2022-12-13 DIAGNOSIS — Z12.31 VISIT FOR SCREENING MAMMOGRAM: ICD-10-CM

## 2022-12-13 PROCEDURE — 77067 SCR MAMMO BI INCL CAD: CPT

## 2022-12-19 ENCOUNTER — OFFICE VISIT (OUTPATIENT)
Dept: ORTHOPEDIC SURGERY | Age: 62
End: 2022-12-19
Payer: COMMERCIAL

## 2022-12-19 VITALS
HEIGHT: 66 IN | BODY MASS INDEX: 42.11 KG/M2 | WEIGHT: 262 LBS | HEART RATE: 95 BPM | RESPIRATION RATE: 18 BRPM | TEMPERATURE: 97.3 F | OXYGEN SATURATION: 97 %

## 2022-12-19 DIAGNOSIS — M48.062 LUMBAR STENOSIS WITH NEUROGENIC CLAUDICATION: Primary | ICD-10-CM

## 2022-12-19 PROCEDURE — 99204 OFFICE O/P NEW MOD 45 MIN: CPT | Performed by: PHYSICAL MEDICINE & REHABILITATION

## 2022-12-19 RX ORDER — DICLOFENAC SODIUM 75 MG/1
TABLET, DELAYED RELEASE ORAL
COMMUNITY
Start: 2022-12-05

## 2022-12-19 RX ORDER — DICLOFENAC EPOLAMINE 0.01 G/1
PATCH TOPICAL
COMMUNITY
Start: 2022-10-31

## 2022-12-19 NOTE — PROGRESS NOTES
Cecille Guerrero presents today for   Chief Complaint   Patient presents with    Back Pain     Lower         Is someone accompanying this pt? yes    Is the patient using any DME equipment during OV? no    Depression Screening:  3 most recent PHQ Screens 11/1/2022   Little interest or pleasure in doing things Not at all   Feeling down, depressed, irritable, or hopeless Not at all   Total Score PHQ 2 0       Learning Assessment:  No flowsheet data found. Abuse Screening:  Abuse Screening Questionnaire 11/1/2022   Do you ever feel afraid of your partner? N   Are you in a relationship with someone who physically or mentally threatens you? N   Is it safe for you to go home? Y       Fall Risk  No flowsheet data found. OPIOID RISK TOOL  No flowsheet data found. Coordination of Care:  1. Have you been to the ER, urgent care clinic since your last visit? no  Hospitalized since your last visit? no    2. Have you seen or consulted any other health care providers outside of the [address] [place] [address] since your last visit? no Include any pap smears or colon screening.  no

## 2022-12-19 NOTE — PROGRESS NOTES
[address]  [address], [address]  [place], [address] [place] [address] Street  Phone: [phone]  Fax: [phone]        Chemo Betts  : 1960  PCP: Leatha Wright MD    NEW PATIENT EVALUATION      ASSESSMENT AND PLAN    Diagnoses and all orders for this visit:    1. Lumbar stenosis with neurogenic claudication  -     SCHEDULE SURGERY       Kan Damian is a 60 y.o. female with significant tremor and severe lumbar stenosis. She does well with periodic epidural injections. She needs these performed with IV sedation for safety. She was previously receiving JOEY through Dr. Zainab Wright. She wants to continue medication management including gabapentin through Dr. Willian Mcghee. Will schedule for JOEY L5-S1 with IV sedation with Dr. Mercedez Foley. HISTORY OF PRESENT ILLNESS  Kan Damian is seen today in consultation for right side back and buttock pain. Reviewed notes from Dr. Willian Mcghee. The patient states that she got injections from Dr. Zainab Wright in  and they did not help, but the more recent injection in 2022 in the left side of her back with benefit. When she called to schedule a repeat JOEY, she was given literature on weight loss and a plant-based diet. She has moderate to severe multilevel lumbar stenosis. She states that she has numbness in the toes and ball of her left foot that was mostly alleviated after the shots. The numbness in her right foot is intermittent. She reports sciatic pain with radiculopathy in her right leg. She states a 10 min walk/stand tolerance. She has weakness in her legs after walking and states that her legs feel heavy and like she is walking with blocks on her feet. She cannot go grocery shopping, and she cannot  the shower. She reports stress incontinence but she knows when she needs to go and has never wet the bed. She reports no loss of control of her bowels.      She states that she is a smoker and that she was told by a previous provider to switch her diet to a plant-based diet but that this was not feasible at this time. She requests injections for the right side of her back be done with IV sedation due to her tremors. She requests being completely sedated rather than being slightly awake. Pain Assessment  12/19/2022   Location of Pain Back   Severity of Pain 7   Quality of Pain Aching;Dull; Sharp   Duration of Pain Persistent   Frequency of Pain Intermittent   Aggravating Factors Bending; Other (Comment)   Aggravating Factors Comment any movement   Limiting Behavior Yes   Relieving Factors Heat   Result of Injury No       Onset of pain: 3 years, no injury        Investigations:   L MRI 9/2022: severe stenosis L2-L3, L4-L5, moderate stenosis L3-L4  Lumbar x-rays 2020 multilevel listhesis L5-S1 without any evidence of instability. Spine surgery consult: unknown    Treatments:  Physical therapy: Yes, 12/2022 (goes 2x/week); Spinal injections: Dr Ann Henry 10/2022 - JOEY L3-L4 with benefit to left side;   Spinal surgery- No  Beneficial medications: Gabapentin, diclofenac, Skelaxin  Failed medications:      Work Status: retired   Pertinent PMHx:  HTN, RLS, OMAR, CARISSA, Tremor (sees neurology)    Visit Vitals  Pulse 95   Temp 97.3 °F (36.3 °C) (Temporal)   Resp 18   Ht 5' 6\" (1.676 m)   Wt 262 lb (118.8 kg)   SpO2 97% Comment: RA   BMI 42.29 kg/m²       PHYSICAL EXAM    SLR negative  LE strength intact  Tender at L5-S1 bilateral   Resting head tremor    Past Medical History:   Diagnosis Date    Abnormal Pap smear of cervix     Current every day smoker     Essential tremor     Fatty liver     Hyperlipidemia     Hypertension     Lumbosacral radiculopathy at L5     Morbid obesity (HCC)     Other ill-defined conditions(799.89)     benign essential tremor-dystonia    Restless legs     OMAR (stress urinary incontinence, female)     Unspecified sleep apnea     DOES NOT SLEEP WITH CPAP        Past Surgical History: Procedure Laterality Date    ENDOSCOPY, COLON, DIAGNOSTIC      HX DILATION AND CURETTAGE      HX GYN      conezation    HX OTHER SURGICAL      COLONIZATION         Current Outpatient Medications   Medication Sig Dispense Refill    diclofenac (FLECTOR) 1.3 % pt12 APPLY 1 PATCH TO MOST PAINFUL AREA BY TRANSDERMAL ROUTE TWICE DAILY AS NEEDED      diclofenac EC (VOLTAREN) 75 mg EC tablet       rosuvastatin (CRESTOR) 40 mg tablet Take 1 Tablet by mouth in the morning. 90 Tablet 1    vibegron (Gemtesa) 75 mg tab Take  by mouth.      polyethylene glycol (MIRALAX) 17 gram/dose powder Take 17 g by mouth daily. meclizine (ANTIVERT) 25 mg tablet Take 1 Tablet by mouth four (4) times daily as needed for Dizziness. 360 Tablet 3    lisinopriL (PRINIVIL, ZESTRIL) 10 mg tablet Take 1 Tablet by mouth daily. 90 Tablet 3    L-Mfolate-B6 Phos-Methyl-B12 (METANX) 3-35-2 mg tab tab Take 1 Tablet by mouth two (2) times a day. vitamin E, dl,tocopheryl acet, (vitamin E acetate) 180 mg (400 unit) capsule Take  by mouth daily. metaxalone (SKELAXIN) 800 mg tablet TAKE 1 TABLET BY MOUTH THREE TIMES DAILY AS NEEDED FOR MUSCLE SPASM      gabapentin (NEURONTIN) 300 mg capsule Take 300 mg by mouth five (5) times daily. Taking 2 tabs in the AM and 3 tabs in the PM.      fluticasone propionate (FLONASE) 50 mcg/actuation nasal spray 1 Spray daily. prasterone, DHEA, (INTRAROSA VA) INSERT 1 VAGINAL INSERT BY VAGINAL ROUTE EVERY NIGHT AT BEDTIME      spironolactone (ALDACTONE) 25 mg tablet Take 25 mg by mouth daily. Indications: HYPERTENSION      aspirin 81 mg tablet Take 162 mg by mouth daily. Indications: MYOCARDIAL INFARCTION PREVENTION      ALPRAZolam (XANAX) 0.25 mg tablet Take 0.25 mg by mouth three (3) times daily as needed. Indications: ESSENTIAL TREMOR      calcium-cholecalciferol, D3, (CALTRATE 600+D) tablet Take 2 Tabs by mouth daily.         multivit-iron-FA-calcium-mins 18 mg iron-400 mcg-500 mg Ca tab Take 1 Cap by mouth daily. co-enzyme Q-10 (CO Q-10) 100 mg capsule Take 100 mg by mouth daily. trihexyphenidyl (ARTANE) 2 mg tablet Take 4 mg by mouth.  (Patient not taking: Reported on 12/19/2022)          Written by Rayna Henning, as dictated by Lana Lebron MD.

## 2022-12-19 NOTE — LETTER
12/19/2022    Patient: Filomena Pastor   YOB: 1960   Date of Visit: 12/19/2022     Elias Cerna MD  [address] [facility]  [address] [place] [address]  [address] [address]  Via Fax: [phone]    Dear MD Janell Ventura MD,      Thank you for referring Ms. Jackson Chow to [address] [place] [address] for evaluation. My notes for this consultation are attached. If you have questions, please do not hesitate to call me. I look forward to following your patient along with you.       Sincerely,    Parmjit Nicole MD

## 2022-12-21 ENCOUNTER — TELEPHONE (OUTPATIENT)
Dept: ORTHOPEDIC SURGERY | Age: 62
End: 2022-12-21

## 2022-12-21 DIAGNOSIS — M48.062 LUMBAR STENOSIS WITH NEUROGENIC CLAUDICATION: Primary | ICD-10-CM

## 2022-12-21 NOTE — TELEPHONE ENCOUNTER
Zi Powell,                               I have this patient scheduled at Health system on 1/5/23 w /sedation but just noticed that it is for the  [facility] L5/S1 and Dr Rivas Ferrell do these injections. Is there another injection type that she could have ?

## 2022-12-23 NOTE — TELEPHONE ENCOUNTER
I sent a message to Dr. Kiel Montez and am awaiting a response. Order placed for  JOEY L3/4. Prior order/level cx.

## 2022-12-30 DIAGNOSIS — M48.062 LUMBAR STENOSIS WITH NEUROGENIC CLAUDICATION: ICD-10-CM

## 2023-01-31 ENCOUNTER — PATIENT MESSAGE (OUTPATIENT)
Dept: INTERNAL MEDICINE CLINIC | Age: 63
End: 2023-01-31

## 2023-02-01 DIAGNOSIS — Z87.891 PERSONAL HISTORY OF TOBACCO USE, PRESENTING HAZARDS TO HEALTH: Primary | ICD-10-CM

## 2023-02-01 NOTE — PATIENT INSTRUCTIONS

## 2023-02-01 NOTE — PROGRESS NOTES
Discussed with the patient the current USPSTF guidelines released March 9, 2021 for screening for lung cancer. For adults aged 48 to [de-identified] years who have a 20 pack-year smoking history and currently smoke or have quit within the past 15 years the grade B recommendation is to:  Screen for lung cancer with low-dose computed tomography (LDCT) every year. Stop screening once a person has not smoked for 15 years or has a health problem that limits life expectancy or the ability to have lung surgery. The patient  reports that she has been smoking cigarettes. She has a 41.00 pack-year smoking history. She has never used smokeless tobacco..  Discussed with patient the risks and benefits of screening, including over-diagnosis, false positive rate, and total radiation exposure. The patient currently exhibits no signs or symptoms suggestive of lung cancer. Discussed with patient the importance of compliance with yearly annual lung cancer screenings and willingness to undergo diagnosis and treatment if screening scan is positive. In addition, the patient was counseled regarding the importance of remaining smoke free and/or total smoking cessation.     Also reviewed the following if the patient has Medicare that as of February 10, 2022, Medicare only covers LDCT screening in patients aged 51-72 with at least a 20 pack-year smoking history who currently smoke or have quit in the last 15 years

## 2023-02-01 NOTE — TELEPHONE ENCOUNTER
From: Jarad Bell  To: Drea Cox MD  Sent: 1/31/2023 12:17 PM EST  Subject: Yearly Lung Scan    Was told by [facility] that I need a doctors referral to have scan done.  Please send referral.  Thank you,  Bard Chong

## 2023-02-04 DIAGNOSIS — I10 ESSENTIAL HYPERTENSION: Primary | ICD-10-CM

## 2023-02-05 DIAGNOSIS — R63.4 WEIGHT LOSS: Primary | ICD-10-CM

## 2023-02-08 DIAGNOSIS — Z87.891 PERSONAL HISTORY OF TOBACCO USE, PRESENTING HAZARDS TO HEALTH: ICD-10-CM

## 2023-02-16 ENCOUNTER — HOSPITAL ENCOUNTER (OUTPATIENT)
Facility: HOSPITAL | Age: 63
Discharge: HOME | End: 2023-02-16
Payer: COMMERCIAL

## 2023-02-16 DIAGNOSIS — Z87.891 PERSONAL HISTORY OF TOBACCO USE, PRESENTING HAZARDS TO HEALTH: ICD-10-CM

## 2023-02-16 PROCEDURE — 71271 CT THORAX LUNG CANCER SCR C-: CPT

## 2023-02-21 ENCOUNTER — TELEPHONE (OUTPATIENT)
Age: 63
End: 2023-02-21

## 2023-02-21 DIAGNOSIS — R91.1 LUNG NODULE SEEN ON IMAGING STUDY: Primary | ICD-10-CM

## 2023-02-21 NOTE — TELEPHONE ENCOUNTER
Let her know the CT scan of her chest showed a tiny new nodule (3 mm in size). The radiologist recommends checking her lung CT again in 6 months instead of waiting a year. There was also some mucus seen in her bronchial tubes, try to quit smoking, let me know if she needs help.   They should be calling her from [facility] central scheduling sometime soon to schedule her CAT scan sometime in August.  If she has not heard from them in a week, call us

## 2023-03-28 RX ORDER — LISINOPRIL 10 MG/1
10 TABLET ORAL DAILY
Qty: 30 TABLET | Refills: 4 | Status: CANCELLED | OUTPATIENT
Start: 2023-03-28

## 2023-03-29 DIAGNOSIS — I10 ESSENTIAL (PRIMARY) HYPERTENSION: Primary | ICD-10-CM

## 2023-03-29 RX ORDER — LISINOPRIL 10 MG/1
10 TABLET ORAL DAILY
Qty: 90 TABLET | Refills: 3 | Status: SHIPPED | OUTPATIENT
Start: 2023-03-29

## 2023-03-29 NOTE — PROGRESS NOTES
Let her know I just sent lisinopril, I did not disapprove any medications, I never got any messages about refilling it from the pharmacy

## 2023-03-30 ENCOUNTER — TELEPHONE (OUTPATIENT)
Age: 63
End: 2023-03-30

## 2023-04-18 RX ORDER — ROSUVASTATIN CALCIUM 40 MG/1
40 TABLET, COATED ORAL DAILY
Qty: 90 TABLET | Refills: 3 | Status: SHIPPED | OUTPATIENT
Start: 2023-04-18

## 2023-04-18 NOTE — TELEPHONE ENCOUNTER
----- Message from Warden Huggins sent at 4/17/2023  7:37 PM EDT -----  Regarding: Rosuvastatin 40 mg   Contact: [phone]  Of course [facility] has said they requested. Please send refill.   Thank you

## 2023-05-04 ENCOUNTER — HOSPITAL ENCOUNTER (OUTPATIENT)
Facility: HOSPITAL | Age: 63
Setting detail: SPECIMEN
Discharge: HOME | End: 2023-05-04
Payer: COMMERCIAL

## 2023-05-04 DIAGNOSIS — R63.4 WEIGHT LOSS: ICD-10-CM

## 2023-05-04 DIAGNOSIS — I10 ESSENTIAL HYPERTENSION: ICD-10-CM

## 2023-05-04 LAB
ALBUMIN SERPL-MCNC: 3.6 G/DL (ref 3.4–5)
ALBUMIN/GLOB SERPL: 1.1 (ref 0.8–1.7)
ALP SERPL-CCNC: 86 U/L (ref 45–117)
ALT SERPL-CCNC: 48 U/L (ref 13–56)
ANION GAP SERPL CALC-SCNC: 8 MMOL/L (ref 3–18)
AST SERPL-CCNC: 38 U/L (ref 10–38)
BILIRUB SERPL-MCNC: 0.4 MG/DL (ref 0.2–1)
BUN SERPL-MCNC: 10 MG/DL (ref 7–18)
BUN/CREAT SERPL: 15 (ref 12–20)
CALCIUM SERPL-MCNC: 9.6 MG/DL (ref 8.5–10.1)
CHLORIDE SERPL-SCNC: 107 MMOL/L (ref 100–111)
CO2 SERPL-SCNC: 28 MMOL/L (ref 21–32)
CREAT SERPL-MCNC: 0.66 MG/DL (ref 0.6–1.3)
GLOBULIN SER CALC-MCNC: 3.4 G/DL (ref 2–4)
GLUCOSE SERPL-MCNC: 89 MG/DL (ref 74–99)
POTASSIUM SERPL-SCNC: 4.8 MMOL/L (ref 3.5–5.5)
PROT SERPL-MCNC: 7 G/DL (ref 6.4–8.2)
SODIUM SERPL-SCNC: 143 MMOL/L (ref 136–145)
TSH SERPL DL<=0.05 MIU/L-ACNC: 1.16 UIU/ML (ref 0.36–3.74)

## 2023-05-04 PROCEDURE — 36415 COLL VENOUS BLD VENIPUNCTURE: CPT

## 2023-05-04 PROCEDURE — 84443 ASSAY THYROID STIM HORMONE: CPT

## 2023-05-04 PROCEDURE — 80053 COMPREHEN METABOLIC PANEL: CPT

## 2023-05-08 ENCOUNTER — OFFICE VISIT (OUTPATIENT)
Age: 63
End: 2023-05-08
Payer: COMMERCIAL

## 2023-05-08 VITALS
OXYGEN SATURATION: 98 % | HEART RATE: 94 BPM | SYSTOLIC BLOOD PRESSURE: 125 MMHG | DIASTOLIC BLOOD PRESSURE: 72 MMHG | WEIGHT: 251 LBS | RESPIRATION RATE: 18 BRPM | HEIGHT: 66 IN | BODY MASS INDEX: 40.34 KG/M2 | TEMPERATURE: 97.2 F

## 2023-05-08 DIAGNOSIS — E66.01 OBESITY, CLASS III, BMI 40-49.9 (MORBID OBESITY) (HCC): ICD-10-CM

## 2023-05-08 DIAGNOSIS — E78.2 MIXED HYPERLIPIDEMIA: ICD-10-CM

## 2023-05-08 DIAGNOSIS — K76.0 FATTY (CHANGE OF) LIVER, NOT ELSEWHERE CLASSIFIED: ICD-10-CM

## 2023-05-08 DIAGNOSIS — Z00.00 ROUTINE PHYSICAL EXAMINATION: Primary | ICD-10-CM

## 2023-05-08 DIAGNOSIS — F17.200 CURRENT EVERY DAY SMOKER: ICD-10-CM

## 2023-05-08 DIAGNOSIS — I10 ESSENTIAL (PRIMARY) HYPERTENSION: ICD-10-CM

## 2023-05-08 DIAGNOSIS — R91.1 LUNG NODULE SEEN ON IMAGING STUDY: ICD-10-CM

## 2023-05-08 PROCEDURE — 3078F DIAST BP <80 MM HG: CPT | Performed by: INTERNAL MEDICINE

## 2023-05-08 PROCEDURE — 99396 PREV VISIT EST AGE 40-64: CPT | Performed by: INTERNAL MEDICINE

## 2023-05-08 PROCEDURE — 3074F SYST BP LT 130 MM HG: CPT | Performed by: INTERNAL MEDICINE

## 2023-05-08 RX ORDER — L-METHYLFOLATE-ALGAE-VIT B12-B6 CAP 3-90.314-2-35 MG 3-90.314-2-35 MG
1 CAP ORAL
COMMUNITY

## 2023-05-08 RX ORDER — CHLORAL HYDRATE 500 MG
1 CAPSULE ORAL
COMMUNITY

## 2023-05-08 RX ORDER — VIBEGRON 75 MG/1
TABLET, FILM COATED ORAL
COMMUNITY
Start: 2023-04-17

## 2023-05-08 RX ORDER — MULTIVIT WITH MINERALS/LUTEIN
1000 TABLET ORAL DAILY
COMMUNITY

## 2023-05-08 RX ORDER — PRASTERONE 6.5 MG/1
INSERT VAGINAL
COMMUNITY

## 2023-05-08 ASSESSMENT — PATIENT HEALTH QUESTIONNAIRE - PHQ9
SUM OF ALL RESPONSES TO PHQ QUESTIONS 1-9: 0
1. LITTLE INTEREST OR PLEASURE IN DOING THINGS: 0
SUM OF ALL RESPONSES TO PHQ QUESTIONS 1-9: 0
2. FEELING DOWN, DEPRESSED OR HOPELESS: 0
SUM OF ALL RESPONSES TO PHQ9 QUESTIONS 1 & 2: 0
SUM OF ALL RESPONSES TO PHQ QUESTIONS 1-9: 0
SUM OF ALL RESPONSES TO PHQ QUESTIONS 1-9: 0

## 2023-05-08 ASSESSMENT — ENCOUNTER SYMPTOMS
SHORTNESS OF BREATH: 0
BLOOD IN STOOL: 0

## 2023-05-08 NOTE — PROGRESS NOTES
MELANY Phelps is here for a routine physical exam.  Her   about 10 days ago after a long illness/hospitalization. She had cut down to 1/2 pack of cigarettes daily, but we discussed that she is back to 1 daily. She notes a lot of postnasal drip and phlegm in her throat, and is aware that stopping smoking would help with that. Weight loss continues to be deliberate with portion sizes. No new family history reported. She is due for a 6-month follow-up CT of her lungs for lung nodule seen on her low-dose CT scan February of this year. Past Surgical History:   Procedure Laterality Date    COLONOSCOPY      DILATION AND CURETTAGE OF UTERUS      GYN      conezation    OTHER SURGICAL HISTORY      COLONIZATION        Current Outpatient Medications   Medication Sig Dispense Refill    Omega-3 Fatty Acids (FISH OIL) 1000 MG capsule Take 1 capsule by mouth      GEMTESA 75 MG TABS tablet       ASPIRIN 81 PO Take by mouth Low Dose      Multiple Vitamins-Minerals (WOMENS MULTI PO) Take by mouth      vitamin E 1000 units capsule Take 1 capsule by mouth daily      Prasterone (INTRAROSA) 6.5 MG INST Place vaginally      L-methylfolate-B6-B12 (METANX) 8-15.507-3-20 MG CAPS capsule Take 1 capsule by mouth      rosuvastatin (CRESTOR) 40 MG tablet Take 1 tablet by mouth daily 90 tablet 3    meclizine (ANTIVERT) 25 MG tablet Take 1 tablet by mouth 4 times daily as needed for Dizziness or Nausea 360 tablet 1    lisinopril (PRINIVIL;ZESTRIL) 10 MG tablet Take 1 tablet by mouth daily 90 tablet 3    ALPRAZolam (XANAX) 0.25 MG tablet Take 1 tablet by mouth 3 times daily as needed.       calcium carb-cholecalciferol 600-10 MG-MCG TABS per tab Take 2 tablets by mouth daily      coenzyme Q10 100 MG CAPS capsule Take 1 capsule by mouth daily      diclofenac (FLECTOR) 1.3 % PTCH patch APPLY 1 PATCH TO MOST PAINFUL AREA BY TRANSDERMAL ROUTE TWICE DAILY AS NEEDED      diclofenac (VOLTAREN) 75 MG EC tablet ceived the following

## 2023-05-30 ENCOUNTER — TELEPHONE (OUTPATIENT)
Age: 63
End: 2023-05-30

## 2023-05-30 LAB — NONINV COLON CA DNA+OCC BLD SCRN STL QL: NEGATIVE

## 2023-08-21 ENCOUNTER — HOSPITAL ENCOUNTER (OUTPATIENT)
Facility: HOSPITAL | Age: 63
Discharge: HOME | End: 2023-08-24
Attending: INTERNAL MEDICINE
Payer: COMMERCIAL

## 2023-08-21 DIAGNOSIS — R91.1 LUNG NODULE SEEN ON IMAGING STUDY: ICD-10-CM

## 2023-08-21 PROCEDURE — 71250 CT THORAX DX C-: CPT

## 2023-08-25 ENCOUNTER — TELEPHONE (OUTPATIENT)
Age: 63
End: 2023-08-25

## 2023-08-25 NOTE — TELEPHONE ENCOUNTER
There are a few, small, new lung nodules on her CT scan, radiologist recommends checking in 6 months instead of 12 months, okay to set up?

## 2023-11-06 ENCOUNTER — HOSPITAL ENCOUNTER (OUTPATIENT)
Facility: HOSPITAL | Age: 63
Setting detail: SPECIMEN
Discharge: HOME | End: 2023-11-09
Payer: COMMERCIAL

## 2023-11-06 DIAGNOSIS — K76.0 FATTY (CHANGE OF) LIVER, NOT ELSEWHERE CLASSIFIED: ICD-10-CM

## 2023-11-06 DIAGNOSIS — Z00.00 ROUTINE PHYSICAL EXAMINATION: ICD-10-CM

## 2023-11-06 DIAGNOSIS — E78.2 MIXED HYPERLIPIDEMIA: ICD-10-CM

## 2023-11-06 LAB
CHOLEST SERPL-MCNC: 140 MG/DL
ERYTHROCYTE [DISTWIDTH] IN BLOOD BY AUTOMATED COUNT: 12.8 % (ref 11.6–14.5)
HCT VFR BLD AUTO: 44.6 % (ref 35–45)
HDLC SERPL-MCNC: 51 MG/DL (ref 40–60)
HDLC SERPL: 2.7 (ref 0–5)
HGB BLD-MCNC: 14.2 G/DL (ref 12–16)
LDLC SERPL CALC-MCNC: 66.4 MG/DL (ref 0–100)
LIPID PANEL: NORMAL
MCH RBC QN AUTO: 31.6 PG (ref 24–34)
MCHC RBC AUTO-ENTMCNC: 31.8 G/DL (ref 31–37)
MCV RBC AUTO: 99.1 FL (ref 78–100)
NRBC # BLD: 0 K/UL (ref 0–0.01)
NRBC BLD-RTO: 0 PER 100 WBC
PLATELET # BLD AUTO: 365 K/UL (ref 135–420)
PMV BLD AUTO: 9.1 FL (ref 9.2–11.8)
RBC # BLD AUTO: 4.5 M/UL (ref 4.2–5.3)
TRIGL SERPL-MCNC: 113 MG/DL
VLDLC SERPL CALC-MCNC: 22.6 MG/DL
WBC # BLD AUTO: 10.4 K/UL (ref 4.6–13.2)

## 2023-11-06 PROCEDURE — 36415 COLL VENOUS BLD VENIPUNCTURE: CPT

## 2023-11-06 PROCEDURE — 82172 ASSAY OF APOLIPOPROTEIN: CPT

## 2023-11-06 PROCEDURE — 83883 ASSAY NEPHELOMETRY NOT SPEC: CPT

## 2023-11-06 PROCEDURE — 82465 ASSAY BLD/SERUM CHOLESTEROL: CPT

## 2023-11-06 PROCEDURE — 84450 TRANSFERASE (AST) (SGOT): CPT

## 2023-11-06 PROCEDURE — 83010 ASSAY OF HAPTOGLOBIN QUANT: CPT

## 2023-11-06 PROCEDURE — 80061 LIPID PANEL: CPT

## 2023-11-06 PROCEDURE — 84478 ASSAY OF TRIGLYCERIDES: CPT

## 2023-11-06 PROCEDURE — 85027 COMPLETE CBC AUTOMATED: CPT

## 2023-11-06 PROCEDURE — 82977 ASSAY OF GGT: CPT

## 2023-11-06 PROCEDURE — 84460 ALANINE AMINO (ALT) (SGPT): CPT

## 2023-11-07 ENCOUNTER — TRANSCRIBE ORDERS (OUTPATIENT)
Facility: HOSPITAL | Age: 63
End: 2023-11-07

## 2023-11-07 DIAGNOSIS — M47.812 CERVICAL SPONDYLOSIS: ICD-10-CM

## 2023-11-07 DIAGNOSIS — R25.1 TREMOR: ICD-10-CM

## 2023-11-07 DIAGNOSIS — M54.2 CERVICALGIA: ICD-10-CM

## 2023-11-07 DIAGNOSIS — G24.3 CERVICAL DYSTONIA: Primary | ICD-10-CM

## 2023-11-07 LAB
A2 MACROGLOB SERPL-MCNC: 202 MG/DL (ref 110–276)
ALT SERPL-CCNC: 31 IU/L (ref 0–40)
APO A-I SERPL-MCNC: 139 MG/DL (ref 116–209)
AST SERPL W P-5'-P-CCNC: 38 IU/L (ref 0–40)
BILIRUB SERPL-MCNC: 0.2 MG/DL (ref 0–1.2)
CHOLEST SERPL-MCNC: 143 MG/DL (ref 100–199)
FIBROSIS SCORING:: ABNORMAL
FIBROSIS STAGE SERPL QL: ABNORMAL
GGT SERPL-CCNC: 26 IU/L (ref 0–60)
GLUCOSE SERPL-MCNC: 82 MG/DL (ref 70–99)
HAPTOGLOB SERPL-MCNC: 312 MG/DL (ref 37–355)
INTERPRETATION: ABNORMAL
LABORATORY COMMENT REPORT: ABNORMAL
LIVER FIBR SCORE SERPL CALC.FIBROSURE: 0.09 (ref 0–0.21)
Lab: ABNORMAL
NASH - STEATOSIS GRADE: ABNORMAL
NASH - STEATOSIS GRADING: ABNORMAL
NASH - STEATOSIS SCORE: 0.45 (ref 0–0.4)
NASH SCORING: ABNORMAL
NECROINFLAMMATORY ACT GRADE SERPL QL: ABNORMAL
NECROINFLAMMATORY ACT SCORE SERPL: 0.58 (ref 0–0.25)
SERVICE CMNT-IMP: ABNORMAL
TRIGL SERPL-MCNC: 104 MG/DL (ref 0–149)

## 2023-11-11 SDOH — ECONOMIC STABILITY: INCOME INSECURITY: HOW HARD IS IT FOR YOU TO PAY FOR THE VERY BASICS LIKE FOOD, HOUSING, MEDICAL CARE, AND HEATING?: NOT HARD AT ALL

## 2023-11-11 SDOH — ECONOMIC STABILITY: HOUSING INSECURITY
IN THE LAST 12 MONTHS, WAS THERE A TIME WHEN YOU DID NOT HAVE A STEADY PLACE TO SLEEP OR SLEPT IN A SHELTER (INCLUDING NOW)?: NO

## 2023-11-11 SDOH — ECONOMIC STABILITY: TRANSPORTATION INSECURITY
IN THE PAST 12 MONTHS, HAS LACK OF TRANSPORTATION KEPT YOU FROM MEETINGS, WORK, OR FROM GETTING THINGS NEEDED FOR DAILY LIVING?: NO

## 2023-11-11 SDOH — ECONOMIC STABILITY: FOOD INSECURITY: WITHIN THE PAST 12 MONTHS, THE FOOD YOU BOUGHT JUST DIDN'T LAST AND YOU DIDN'T HAVE MONEY TO GET MORE.: NEVER TRUE

## 2023-11-11 SDOH — ECONOMIC STABILITY: FOOD INSECURITY: WITHIN THE PAST 12 MONTHS, YOU WORRIED THAT YOUR FOOD WOULD RUN OUT BEFORE YOU GOT MONEY TO BUY MORE.: NEVER TRUE

## 2023-11-13 ENCOUNTER — OFFICE VISIT (OUTPATIENT)
Age: 63
End: 2023-11-13
Payer: COMMERCIAL

## 2023-11-13 VITALS
DIASTOLIC BLOOD PRESSURE: 59 MMHG | RESPIRATION RATE: 18 BRPM | BODY MASS INDEX: 38.09 KG/M2 | WEIGHT: 237 LBS | HEART RATE: 80 BPM | TEMPERATURE: 97.9 F | SYSTOLIC BLOOD PRESSURE: 89 MMHG | HEIGHT: 66 IN | OXYGEN SATURATION: 100 %

## 2023-11-13 DIAGNOSIS — Z87.891 PERSONAL HISTORY OF TOBACCO USE: ICD-10-CM

## 2023-11-13 DIAGNOSIS — E66.01 CLASS 2 SEVERE OBESITY DUE TO EXCESS CALORIES WITH SERIOUS COMORBIDITY AND BODY MASS INDEX (BMI) OF 38.0 TO 38.9 IN ADULT (HCC): ICD-10-CM

## 2023-11-13 DIAGNOSIS — R91.1 LUNG NODULE SEEN ON IMAGING STUDY: ICD-10-CM

## 2023-11-13 DIAGNOSIS — K76.0 FATTY (CHANGE OF) LIVER, NOT ELSEWHERE CLASSIFIED: ICD-10-CM

## 2023-11-13 DIAGNOSIS — F17.200 CURRENT EVERY DAY SMOKER: Primary | ICD-10-CM

## 2023-11-13 DIAGNOSIS — I10 ESSENTIAL (PRIMARY) HYPERTENSION: ICD-10-CM

## 2023-11-13 PROCEDURE — 99215 OFFICE O/P EST HI 40 MIN: CPT | Performed by: INTERNAL MEDICINE

## 2023-11-13 PROCEDURE — 3078F DIAST BP <80 MM HG: CPT | Performed by: INTERNAL MEDICINE

## 2023-11-13 PROCEDURE — G0296 VISIT TO DETERM LDCT ELIG: HCPCS | Performed by: INTERNAL MEDICINE

## 2023-11-13 PROCEDURE — 3074F SYST BP LT 130 MM HG: CPT | Performed by: INTERNAL MEDICINE

## 2023-11-13 RX ORDER — BUPROPION HYDROCHLORIDE 100 MG/1
TABLET, EXTENDED RELEASE ORAL
Qty: 180 TABLET | Refills: 1 | Status: SHIPPED | OUTPATIENT
Start: 2023-11-13

## 2023-11-13 ASSESSMENT — PATIENT HEALTH QUESTIONNAIRE - PHQ9
SUM OF ALL RESPONSES TO PHQ QUESTIONS 1-9: 0
1. LITTLE INTEREST OR PLEASURE IN DOING THINGS: 0
2. FEELING DOWN, DEPRESSED OR HOPELESS: 0
SUM OF ALL RESPONSES TO PHQ QUESTIONS 1-9: 0
SUM OF ALL RESPONSES TO PHQ9 QUESTIONS 1 & 2: 0

## 2023-11-13 ASSESSMENT — ENCOUNTER SYMPTOMS
COUGH: 0
BLOOD IN STOOL: 0
SHORTNESS OF BREATH: 0

## 2023-11-14 NOTE — PROGRESS NOTES
Rufus Copeland presents today for   Chief Complaint   Patient presents with    Hypertension                 1. \"Have you been to the ER, urgent care clinic since your last visit? Hospitalized since your last visit? \" no    2. \"Have you seen or consulted any other health care providers outside of the [address] [place] [address] since your last visit? \" no     3. For patients aged 43-73: Has the patient had a colonoscopy / FIT/ Cologuard? Yes - no Care Gap present      If the patient is female:    4. For patients aged 43-66: Has the patient had a mammogram within the past 2 years? Yes - no Care Gap present      5. For patients aged 21-65: Has the patient had a pap smear?  No
reviewed the following if the patient has Medicare that as of February 10, 2022, Medicare only covers LDCT screening in patients aged 53-69 with at least a 20 pack-year smoking history who currently smoke or have quit in the last 15 years

## 2023-12-05 RX ORDER — MECLIZINE HYDROCHLORIDE 25 MG/1
25 TABLET ORAL 4 TIMES DAILY PRN
Qty: 360 TABLET | Refills: 1 | Status: SHIPPED | OUTPATIENT
Start: 2023-12-05

## 2023-12-05 NOTE — TELEPHONE ENCOUNTER
----- Message from Eliza Sherman sent at 12/4/2023 12:14 PM EST -----  Regarding: Request for refills of Meclizine 25mg RX Tablets  Contact: [phone]  [facility] has said they requested refills for the subject medication. Please send refills.  Thank you

## 2024-01-05 ENCOUNTER — OFFICE VISIT (OUTPATIENT)
Age: 64
End: 2024-01-05
Payer: COMMERCIAL

## 2024-01-05 VITALS
TEMPERATURE: 98 F | SYSTOLIC BLOOD PRESSURE: 120 MMHG | WEIGHT: 236 LBS | DIASTOLIC BLOOD PRESSURE: 75 MMHG | RESPIRATION RATE: 16 BRPM | BODY MASS INDEX: 38.09 KG/M2 | HEART RATE: 74 BPM | OXYGEN SATURATION: 98 %

## 2024-01-05 DIAGNOSIS — Z01.818 PRE-OP EVALUATION: Primary | ICD-10-CM

## 2024-01-05 DIAGNOSIS — E78.2 MIXED HYPERLIPIDEMIA: ICD-10-CM

## 2024-01-05 DIAGNOSIS — F17.200 CURRENT EVERY DAY SMOKER: ICD-10-CM

## 2024-01-05 PROCEDURE — 3074F SYST BP LT 130 MM HG: CPT | Performed by: INTERNAL MEDICINE

## 2024-01-05 PROCEDURE — 99214 OFFICE O/P EST MOD 30 MIN: CPT | Performed by: INTERNAL MEDICINE

## 2024-01-05 PROCEDURE — 3078F DIAST BP <80 MM HG: CPT | Performed by: INTERNAL MEDICINE

## 2024-01-05 RX ORDER — BUPROPION HYDROCHLORIDE 150 MG/1
150 TABLET, EXTENDED RELEASE ORAL 2 TIMES DAILY
Qty: 180 TABLET | Refills: 1 | Status: SHIPPED | OUTPATIENT
Start: 2024-01-05

## 2024-01-05 RX ORDER — ROSUVASTATIN CALCIUM 40 MG/1
40 TABLET, COATED ORAL DAILY
Qty: 90 TABLET | Refills: 3 | Status: SHIPPED | OUTPATIENT
Start: 2024-01-05

## 2024-01-05 ASSESSMENT — PATIENT HEALTH QUESTIONNAIRE - PHQ9
1. LITTLE INTEREST OR PLEASURE IN DOING THINGS: 0
2. FEELING DOWN, DEPRESSED OR HOPELESS: 0
SUM OF ALL RESPONSES TO PHQ QUESTIONS 1-9: 0
SUM OF ALL RESPONSES TO PHQ QUESTIONS 1-9: 0
SUM OF ALL RESPONSES TO PHQ9 QUESTIONS 1 & 2: 0
SUM OF ALL RESPONSES TO PHQ QUESTIONS 1-9: 0
SUM OF ALL RESPONSES TO PHQ QUESTIONS 1-9: 0

## 2024-01-05 ASSESSMENT — ENCOUNTER SYMPTOMS: SHORTNESS OF BREATH: 0

## 2024-01-05 NOTE — PROGRESS NOTES
Keila Romero presents today for   Chief Complaint   Patient presents with    1 Year Follow Up                 1. \"Have you been to the ER, urgent care clinic since your last visit?  Hospitalized since your last visit?\" no    2. \"Have you seen or consulted any other health care providers outside of the [facility] since your last visit?\" no     3. For patients aged 45-75: Has the patient had a colonoscopy / FIT/ Cologuard? Yes - no Care Gap present      If the patient is female:    4. For patients aged 40-74: Has the patient had a mammogram within the past 2 years? No      5. For patients aged 21-65: Has the patient had a pap smear? No   
tablet 3    ALPRAZolam (XANAX) 0.25 MG tablet Take 1 tablet by mouth 3 times daily as needed.      coenzyme Q10 100 MG CAPS capsule Take 1 capsule by mouth daily      diclofenac (FLECTOR) 1.3 % PTCH patch APPLY 1 PATCH TO MOST PAINFUL AREA BY TRANSDERMAL ROUTE TWICE DAILY AS NEEDED      diclofenac (VOLTAREN) 75 MG EC tablet ceived the following from [facility]: Outside name: diclofenac EC (VOLTAREN) 75 mg EC tablet      fluticasone (FLONASE) 50 MCG/ACT nasal spray 1 spray daily      gabapentin (NEURONTIN) 300 MG capsule Take 1 capsule by mouth 5 times daily.      metaxalone (SKELAXIN) 800 MG tablet TAKE 1 TABLET BY MOUTH THREE TIMES DAILY AS NEEDED FOR MUSCLE SPASM      polyethylene glycol (GLYCOLAX) 17 GM/SCOOP powder Take 17 g by mouth daily      trihexyphenidyl (ARTANE) 2 MG tablet Take 2 tablets by mouth      vitamin E 1000 units capsule Take 1 capsule by mouth daily       No current facility-administered medications for this visit.        No Known Allergies     Social History     Socioeconomic History    Marital status:      Spouse name: Not on file    Number of children: Not on file    Years of education: Not on file    Highest education level: Not on file   Occupational History    Not on file   Tobacco Use    Smoking status: Every Day     Current packs/day: 1.00     Types: Cigarettes    Smokeless tobacco: Never   Substance and Sexual Activity    Alcohol use: Yes     Alcohol/week: 6.0 standard drinks of alcohol    Drug use: No    Sexual activity: Not on file   Other Topics Concern    Not on file   Social History Narrative    Not on file     Social Determinants of Health     Financial Resource Strain: Not on file   Food Insecurity: Not on file (11/11/2023)   Transportation Needs: Not on file   Physical Activity: Insufficiently Active (12/18/2022)    Exercise Vital Sign     Days of Exercise per Week: 2 days     Minutes of Exercise per Session: 10 min   Stress: Not on file   Social

## 2024-01-24 ENCOUNTER — TRANSCRIBE ORDERS (OUTPATIENT)
Facility: HOSPITAL | Age: 64
End: 2024-01-24

## 2024-01-24 DIAGNOSIS — Z12.31 VISIT FOR SCREENING MAMMOGRAM: Primary | ICD-10-CM

## 2024-01-31 ENCOUNTER — HOSPITAL ENCOUNTER (OUTPATIENT)
Facility: HOSPITAL | Age: 64
Discharge: HOME | End: 2024-02-03
Attending: INTERNAL MEDICINE
Payer: COMMERCIAL

## 2024-01-31 VITALS — HEIGHT: 66 IN | BODY MASS INDEX: 38.57 KG/M2 | WEIGHT: 240 LBS

## 2024-01-31 DIAGNOSIS — Z12.31 VISIT FOR SCREENING MAMMOGRAM: ICD-10-CM

## 2024-01-31 PROCEDURE — 77063 BREAST TOMOSYNTHESIS BI: CPT

## 2024-03-24 DIAGNOSIS — I10 ESSENTIAL (PRIMARY) HYPERTENSION: ICD-10-CM

## 2024-03-25 RX ORDER — LISINOPRIL 10 MG/1
10 TABLET ORAL DAILY
Qty: 90 TABLET | Refills: 3 | Status: SHIPPED | OUTPATIENT
Start: 2024-03-25

## 2024-04-10 DIAGNOSIS — E78.2 MIXED HYPERLIPIDEMIA: ICD-10-CM

## 2024-04-10 RX ORDER — BUPROPION HYDROCHLORIDE 150 MG/1
150 TABLET, EXTENDED RELEASE ORAL 2 TIMES DAILY
Qty: 180 TABLET | Refills: 1 | Status: SHIPPED | OUTPATIENT
Start: 2024-04-10

## 2024-05-21 DIAGNOSIS — N32.81 OVERACTIVE BLADDER: Primary | ICD-10-CM

## 2024-05-21 RX ORDER — VIBEGRON 75 MG/1
TABLET, FILM COATED ORAL
Qty: 90 TABLET | Refills: 1 | Status: SHIPPED | OUTPATIENT
Start: 2024-05-21

## 2024-05-21 NOTE — TELEPHONE ENCOUNTER
----- Message from Keila Romero sent at 5/20/2024  3:06 PM EDT -----  Regarding: Gemtesa (vibegron) 75 mg tablets  Contact: [phone]  Dr. Trevino has been prescribing this for me the past couple of years. I was informed by her office many months ago that she had shut down her office and was going to work for the [employer]. She made sure all of her patients received many refills. Mine has finally run out. Please send [facility] on [address] refills to last until my appt with you in July. Any questions please call me.  Thank you.

## 2024-07-18 ENCOUNTER — OFFICE VISIT (OUTPATIENT)
Facility: CLINIC | Age: 64
End: 2024-07-18
Payer: MEDICARE

## 2024-07-18 VITALS
HEART RATE: 63 BPM | SYSTOLIC BLOOD PRESSURE: 129 MMHG | OXYGEN SATURATION: 97 % | DIASTOLIC BLOOD PRESSURE: 74 MMHG | WEIGHT: 232 LBS | TEMPERATURE: 98.2 F | HEIGHT: 66 IN | RESPIRATION RATE: 18 BRPM | BODY MASS INDEX: 37.28 KG/M2

## 2024-07-18 DIAGNOSIS — E78.2 MIXED HYPERLIPIDEMIA: ICD-10-CM

## 2024-07-18 DIAGNOSIS — Z00.00 WELCOME TO MEDICARE PREVENTIVE VISIT: Primary | ICD-10-CM

## 2024-07-18 DIAGNOSIS — N32.81 OVERACTIVE BLADDER: ICD-10-CM

## 2024-07-18 DIAGNOSIS — F17.200 CURRENT EVERY DAY SMOKER: ICD-10-CM

## 2024-07-18 DIAGNOSIS — E66.01 SEVERE OBESITY (BMI 35.0-39.9) WITH COMORBIDITY (HCC): ICD-10-CM

## 2024-07-18 DIAGNOSIS — I10 ESSENTIAL (PRIMARY) HYPERTENSION: ICD-10-CM

## 2024-07-18 DIAGNOSIS — R42 VERTIGO: ICD-10-CM

## 2024-07-18 PROCEDURE — 1123F ACP DISCUSS/DSCN MKR DOCD: CPT | Performed by: INTERNAL MEDICINE

## 2024-07-18 PROCEDURE — 3017F COLORECTAL CA SCREEN DOC REV: CPT | Performed by: INTERNAL MEDICINE

## 2024-07-18 PROCEDURE — 3078F DIAST BP <80 MM HG: CPT | Performed by: INTERNAL MEDICINE

## 2024-07-18 PROCEDURE — 3074F SYST BP LT 130 MM HG: CPT | Performed by: INTERNAL MEDICINE

## 2024-07-18 PROCEDURE — 4004F PT TOBACCO SCREEN RCVD TLK: CPT | Performed by: INTERNAL MEDICINE

## 2024-07-18 PROCEDURE — 99214 OFFICE O/P EST MOD 30 MIN: CPT | Performed by: INTERNAL MEDICINE

## 2024-07-18 PROCEDURE — G8417 CALC BMI ABV UP PARAM F/U: HCPCS | Performed by: INTERNAL MEDICINE

## 2024-07-18 PROCEDURE — G8427 DOCREV CUR MEDS BY ELIG CLIN: HCPCS | Performed by: INTERNAL MEDICINE

## 2024-07-18 PROCEDURE — 1090F PRES/ABSN URINE INCON ASSESS: CPT | Performed by: INTERNAL MEDICINE

## 2024-07-18 PROCEDURE — G0402 INITIAL PREVENTIVE EXAM: HCPCS | Performed by: INTERNAL MEDICINE

## 2024-07-18 PROCEDURE — G8399 PT W/DXA RESULTS DOCUMENT: HCPCS | Performed by: INTERNAL MEDICINE

## 2024-07-18 RX ORDER — MECLIZINE HYDROCHLORIDE 25 MG/1
25 TABLET ORAL 4 TIMES DAILY PRN
Qty: 360 TABLET | Refills: 1 | Status: SHIPPED | OUTPATIENT
Start: 2024-07-18

## 2024-07-18 RX ORDER — VIBEGRON 75 MG/1
TABLET, FILM COATED ORAL
Qty: 90 TABLET | Refills: 1 | Status: SHIPPED | OUTPATIENT
Start: 2024-07-18

## 2024-07-18 RX ORDER — BUPROPION HYDROCHLORIDE 200 MG/1
200 TABLET, EXTENDED RELEASE ORAL 2 TIMES DAILY
Qty: 180 TABLET | Refills: 1 | Status: SHIPPED | OUTPATIENT
Start: 2024-07-18

## 2024-07-18 ASSESSMENT — PATIENT HEALTH QUESTIONNAIRE - PHQ9
SUM OF ALL RESPONSES TO PHQ QUESTIONS 1-9: 0
SUM OF ALL RESPONSES TO PHQ9 QUESTIONS 1 & 2: 0
2. FEELING DOWN, DEPRESSED OR HOPELESS: NOT AT ALL
SUM OF ALL RESPONSES TO PHQ QUESTIONS 1-9: 0
1. LITTLE INTEREST OR PLEASURE IN DOING THINGS: NOT AT ALL

## 2024-07-18 ASSESSMENT — LIFESTYLE VARIABLES
HOW MANY STANDARD DRINKS CONTAINING ALCOHOL DO YOU HAVE ON A TYPICAL DAY: 1 OR 2
HOW OFTEN DO YOU HAVE A DRINK CONTAINING ALCOHOL: MONTHLY OR LESS

## 2024-07-18 ASSESSMENT — ENCOUNTER SYMPTOMS
SHORTNESS OF BREATH: 0
BACK PAIN: 1
BLOOD IN STOOL: 0

## 2024-07-18 NOTE — PROGRESS NOTES
Keila Romero presents today for physical.              1. \"Have you been to the ER, urgent care clinic since your last visit?  Hospitalized since your last visit?\" no    2. \"Have you seen or consulted any other health care providers outside of the [facility] since your last visit?\" no     3. For patients aged 45-75: Has the patient had a colonoscopy / FIT/ Cologuard? Yes - no Care Gap present      If the patient is female:    4. For patients aged 40-74: Has the patient had a mammogram within the past 2 years? Yes - no Care Gap present      5. For patients aged 21-65: Has the patient had a pap smear? No  
Annual Wellness Visit    Keila Romero is here for Medicare AWV    Assessment & Plan   Welcome to Medicare preventive visit  Overactive bladder  The following orders have not been finalized:  -     GEMTESA 75 MG TABS tablet    Recommendations for Preventive Services Due: see orders and patient instructions/AVS.  Recommended screening schedule for the next 5-10 years is provided to the patient in written form: see Patient Instructions/AVS.     No follow-ups on file.     Subjective       Patient's complete Health Risk Assessment and screening values have been reviewed and are found in Flowsheets. The following problems were reviewed today and where indicated follow up appointments were made and/or referrals ordered.    Positive Risk Factor Screenings with Interventions:                Inactivity:  On average, how many days per week do you engage in moderate to strenuous exercise (like a brisk walk)?: 0 days (!) Abnormal  On average, how many minutes do you engage in exercise at this level?: 0 min  Interventions - Inactivity:  WALKS W/ ROLLATOR, LIMITED BY BACK PAIN   Abnormal BMI (obese):  Body mass index is 37.45 kg/m². (!) Abnormal  Interventions:  CONITNUES TO LOSE WEIGHT           Safety:  Do you have non-slip mats or non-slip surfaces or shower bars or grab bars in your shower or bathtub?: (!) No  Interventions:  Has a shower chair      Tobacco Use:    Tobacco Use      Smoking status: Every Day        Packs/day: 1.00        Types: Cigarettes      Smokeless tobacco: Never     Interventions:  About 1 ppd, increase dose of Wellbultrin                      Objective   Vitals:    07/18/24 0822   BP: 129/74   Pulse: 63   Resp: 18   Temp: 98.2 °F (36.8 °C)   TempSrc: Temporal   SpO2: 97%   Weight: 105.2 kg (232 lb)   Height: 1.676 m (5' 6\")      Body mass index is 37.45 kg/m².                  No Known Allergies  Prior to Visit Medications    Medication Sig Taking? Authorizing Provider   GEMTESA 75 MG TABS tablet 1 po

## 2024-10-24 ENCOUNTER — TRANSCRIBE ORDERS (OUTPATIENT)
Facility: HOSPITAL | Age: 64
End: 2024-10-24

## 2024-10-24 DIAGNOSIS — Z12.31 OTHER SCREENING MAMMOGRAM: Primary | ICD-10-CM

## 2024-10-29 ENCOUNTER — HOSPITAL ENCOUNTER (OUTPATIENT)
Facility: HOSPITAL | Age: 64
Discharge: HOME | End: 2024-11-01
Attending: INTERNAL MEDICINE
Payer: MEDICARE

## 2024-10-29 DIAGNOSIS — Z87.891 PERSONAL HISTORY OF TOBACCO USE: ICD-10-CM

## 2024-10-29 PROCEDURE — 71271 CT THORAX LUNG CANCER SCR C-: CPT

## 2024-12-16 ENCOUNTER — TELEPHONE (OUTPATIENT)
Facility: CLINIC | Age: 64
End: 2024-12-16

## 2024-12-16 DIAGNOSIS — E78.2 MIXED HYPERLIPIDEMIA: ICD-10-CM

## 2024-12-16 DIAGNOSIS — N32.81 OVERACTIVE BLADDER: ICD-10-CM

## 2024-12-16 DIAGNOSIS — F17.200 CURRENT EVERY DAY SMOKER: ICD-10-CM

## 2024-12-16 DIAGNOSIS — R42 VERTIGO: ICD-10-CM

## 2024-12-16 NOTE — TELEPHONE ENCOUNTER
Patient had a CT lung Screen done on10/29/24. She was told that her provider would go over the results. Please review and advise. Pt requesting a call to discuss the findings and results.      Refill request via phone     Medication: rosuvastatin (CRESTOR) 40 MG tablet   Quantity: 90  Pharmacy: [facility] [address] [place] [address] [address]   Last Fill: 1/5/2024    Medication: buPROPion (WELLBUTRIN SR) 200 MG extended release tablet   Quantity: 180  Pharmacy: [facility] [address] [place] [address] [address]   Last Fill: 7/18/2024    Medication: GEMTESA 75 MG TABS tablet   Quantity: 90  Pharmacy: [facility] [address] [place] [address] [address]   Last Fill: 7/18/2024    Medication: meclizine (ANTIVERT) 25 MG tablet   Quantity: 360  Pharmacy: [address]   Last Fill: 7/18/2024    PCP: Gila Gill MD    LAST OFFICE VISIT: 7/182024 [facility]

## 2024-12-16 NOTE — TELEPHONE ENCOUNTER
Patient was notified of Dr. Gill's care home. Patient aware that future appointments with Dr. Gill have been cancelled and [facility] will provide patient the needed refills on medication for 90 days.   Patient was offered the phone numbers of offices in [facility] taking new patients, [facility] [phone] and [facility] [phone].  All questions were answered and patient voiced understanding.

## 2024-12-17 RX ORDER — VIBEGRON 75 MG/1
TABLET, FILM COATED ORAL
Qty: 90 TABLET | Refills: 0 | Status: SHIPPED | OUTPATIENT
Start: 2024-12-17

## 2024-12-17 RX ORDER — MECLIZINE HYDROCHLORIDE 25 MG/1
25 TABLET ORAL 3 TIMES DAILY PRN
Qty: 270 TABLET | Refills: 0 | Status: SHIPPED | OUTPATIENT
Start: 2024-12-17

## 2024-12-17 RX ORDER — ROSUVASTATIN CALCIUM 40 MG/1
40 TABLET, COATED ORAL DAILY
Qty: 90 TABLET | Refills: 0 | Status: SHIPPED | OUTPATIENT
Start: 2024-12-17

## 2024-12-17 RX ORDER — BUPROPION HYDROCHLORIDE 200 MG/1
200 TABLET, EXTENDED RELEASE ORAL 2 TIMES DAILY
Qty: 180 TABLET | Refills: 0 | Status: SHIPPED | OUTPATIENT
Start: 2024-12-17

## 2024-12-17 NOTE — TELEPHONE ENCOUNTER
Please advise no suspicious lung nodules were seen. Smaller nodules were seen that were similar to her previous reports and not of high concern. She needs to repeat this study 1 year from the exam date.     Of note, mild coronary calcification was seen. This means there is some calcification in the arteries that supply her heart. They also commented on some level of rotator cuff disease.     My recommendation is that she needs to stop smoking for lung and heart protection and she also needs to review this study with her new PCP. Also needs to be seen regarding chronic Meclizine use. Needs to discuss with new PCP and may need to consider seeing ENT.     Rx refills submitted (90 days). Due for lab so needs to complete this with new PCP as well.     Lab Results   Component Value Date    CHOL 140 11/06/2023    CHOL 143 11/06/2023    TRIG 113 11/06/2023    TRIG 104 11/06/2023    HDL 51 11/06/2023    LDL 66.4 11/06/2023    VLDL 22.6 11/06/2023    CHOLHDLRATIO 2.7 11/06/2023     Lab Results   Component Value Date     05/04/2023    K 4.8 05/04/2023     05/04/2023    CO2 28 05/04/2023    BUN 10 05/04/2023    CREATININE 0.66 05/04/2023    GLUCOSE 82 11/06/2023    CALCIUM 9.6 05/04/2023    BILITOT 0.2 11/06/2023    ALKPHOS 86 05/04/2023    AST 38 05/04/2023    ALT 31 11/06/2023    LABGLOM >60 05/04/2023    GFRAA >60 04/22/2022    AGRATIO 1.1 04/22/2022    GLOB 3.4 05/04/2023

## 2025-02-03 ENCOUNTER — HOSPITAL ENCOUNTER (OUTPATIENT)
Facility: HOSPITAL | Age: 65
Discharge: HOME | End: 2025-02-06
Attending: INTERNAL MEDICINE
Payer: MEDICARE

## 2025-02-03 VITALS — WEIGHT: 231.92 LBS | BODY MASS INDEX: 37.27 KG/M2 | HEIGHT: 66 IN

## 2025-02-03 DIAGNOSIS — Z12.31 OTHER SCREENING MAMMOGRAM: ICD-10-CM

## 2025-02-03 PROCEDURE — 77063 BREAST TOMOSYNTHESIS BI: CPT

## 2025-03-09 SDOH — HEALTH STABILITY: PHYSICAL HEALTH: ON AVERAGE, HOW MANY DAYS PER WEEK DO YOU ENGAGE IN MODERATE TO STRENUOUS EXERCISE (LIKE A BRISK WALK)?: 3 DAYS

## 2025-03-09 SDOH — HEALTH STABILITY: PHYSICAL HEALTH: ON AVERAGE, HOW MANY MINUTES DO YOU ENGAGE IN EXERCISE AT THIS LEVEL?: 50 MIN

## 2025-03-12 ENCOUNTER — OFFICE VISIT (OUTPATIENT)
Facility: CLINIC | Age: 65
End: 2025-03-12
Payer: MEDICARE

## 2025-03-12 VITALS
TEMPERATURE: 97.8 F | HEIGHT: 66 IN | BODY MASS INDEX: 39.37 KG/M2 | OXYGEN SATURATION: 95 % | SYSTOLIC BLOOD PRESSURE: 124 MMHG | DIASTOLIC BLOOD PRESSURE: 76 MMHG | RESPIRATION RATE: 18 BRPM | WEIGHT: 245 LBS

## 2025-03-12 DIAGNOSIS — N32.81 OVERACTIVE BLADDER: ICD-10-CM

## 2025-03-12 DIAGNOSIS — E78.2 MIXED HYPERLIPIDEMIA: ICD-10-CM

## 2025-03-12 DIAGNOSIS — E66.812 CLASS 2 SEVERE OBESITY DUE TO EXCESS CALORIES WITH SERIOUS COMORBIDITY AND BODY MASS INDEX (BMI) OF 39.0 TO 39.9 IN ADULT: ICD-10-CM

## 2025-03-12 DIAGNOSIS — F17.200 NICOTINE DEPENDENCE WITH CURRENT USE: ICD-10-CM

## 2025-03-12 DIAGNOSIS — Z76.89 ENCOUNTER TO ESTABLISH CARE: Primary | ICD-10-CM

## 2025-03-12 DIAGNOSIS — E53.8 B12 DEFICIENCY: ICD-10-CM

## 2025-03-12 DIAGNOSIS — I10 PRIMARY HYPERTENSION: Chronic | ICD-10-CM

## 2025-03-12 DIAGNOSIS — E66.01 CLASS 2 SEVERE OBESITY DUE TO EXCESS CALORIES WITH SERIOUS COMORBIDITY AND BODY MASS INDEX (BMI) OF 39.0 TO 39.9 IN ADULT: ICD-10-CM

## 2025-03-12 PROBLEM — G25.0 HEREDITARY ESSENTIAL TREMOR: Status: ACTIVE | Noted: 2025-03-12

## 2025-03-12 PROCEDURE — 3074F SYST BP LT 130 MM HG: CPT | Performed by: FAMILY MEDICINE

## 2025-03-12 PROCEDURE — 99204 OFFICE O/P NEW MOD 45 MIN: CPT | Performed by: FAMILY MEDICINE

## 2025-03-12 PROCEDURE — 1090F PRES/ABSN URINE INCON ASSESS: CPT | Performed by: FAMILY MEDICINE

## 2025-03-12 PROCEDURE — 3078F DIAST BP <80 MM HG: CPT | Performed by: FAMILY MEDICINE

## 2025-03-12 PROCEDURE — 4004F PT TOBACCO SCREEN RCVD TLK: CPT | Performed by: FAMILY MEDICINE

## 2025-03-12 PROCEDURE — G8417 CALC BMI ABV UP PARAM F/U: HCPCS | Performed by: FAMILY MEDICINE

## 2025-03-12 PROCEDURE — G8399 PT W/DXA RESULTS DOCUMENT: HCPCS | Performed by: FAMILY MEDICINE

## 2025-03-12 PROCEDURE — G8427 DOCREV CUR MEDS BY ELIG CLIN: HCPCS | Performed by: FAMILY MEDICINE

## 2025-03-12 PROCEDURE — 1123F ACP DISCUSS/DSCN MKR DOCD: CPT | Performed by: FAMILY MEDICINE

## 2025-03-12 PROCEDURE — 3017F COLORECTAL CA SCREEN DOC REV: CPT | Performed by: FAMILY MEDICINE

## 2025-03-12 RX ORDER — MIRABEGRON 25 MG/1
25 TABLET, FILM COATED, EXTENDED RELEASE ORAL DAILY
Qty: 30 TABLET | Refills: 3 | Status: SHIPPED | OUTPATIENT
Start: 2025-03-12

## 2025-03-12 RX ORDER — LISINOPRIL 10 MG/1
10 TABLET ORAL DAILY
Qty: 90 TABLET | Refills: 3 | Status: SHIPPED | OUTPATIENT
Start: 2025-03-12

## 2025-03-12 SDOH — ECONOMIC STABILITY: FOOD INSECURITY: WITHIN THE PAST 12 MONTHS, YOU WORRIED THAT YOUR FOOD WOULD RUN OUT BEFORE YOU GOT MONEY TO BUY MORE.: NEVER TRUE

## 2025-03-12 SDOH — ECONOMIC STABILITY: FOOD INSECURITY: WITHIN THE PAST 12 MONTHS, THE FOOD YOU BOUGHT JUST DIDN'T LAST AND YOU DIDN'T HAVE MONEY TO GET MORE.: NEVER TRUE

## 2025-03-12 ASSESSMENT — PATIENT HEALTH QUESTIONNAIRE - PHQ9
2. FEELING DOWN, DEPRESSED OR HOPELESS: NOT AT ALL
1. LITTLE INTEREST OR PLEASURE IN DOING THINGS: NOT AT ALL
SUM OF ALL RESPONSES TO PHQ QUESTIONS 1-9: 0

## 2025-03-12 ASSESSMENT — ENCOUNTER SYMPTOMS
COUGH: 0
CONSTIPATION: 0
ABDOMINAL PAIN: 0
SHORTNESS OF BREATH: 0
BACK PAIN: 1
DIARRHEA: 0
EYE PAIN: 0

## 2025-03-12 NOTE — PROGRESS NOTES
[facility]  Establish care visit   3/12/2025    Keila Romero (: 1960) is a 64 y.o. female, here to establish care.    Chief Complaint   Patient presents with    Establish Care    Hypertension    Sleep Problem    Cholesterol Problem    Weight Management        ASSESSMENT/ PLAN  1. Encounter to establish care  VS reviewed     BMI reviewed   Appropriate healthy lifestyle modifications discussed.  All questions answered.   F/u discussed.    2. Class 2 severe obesity due to excess calories with serious comorbidity and body mass index (BMI) of 39.0 to 39.9 in adult  Uncontrolled. Complicating all aspects of patient care. Discussed healthy lifestyle modifications to implement.    3. Overactive bladder  Controlled but with insurance changing, she cannot afford Gemtesa.  Will trial Myrbetriq in the meantime.  If this does not work or is not covered, will order Detrol.  - mirabegron (MYRBETRIQ) 25 MG TB24; Take 1 tablet by mouth daily  Dispense: 30 tablet; Refill: 3    4. Primary hypertension  Controlled: Appears stable.  We will continue current management and monitor for adverse reaction and disease progression.  Follow-up as noted below  - lisinopril (PRINIVIL;ZESTRIL) 10 MG tablet; Take 1 tablet by mouth daily  Dispense: 90 tablet; Refill: 3  - CBC with Auto Differential; Future  - Comprehensive Metabolic Panel; Future    5. Mixed hyperlipidemia  Controlled: Appears stable.  We will continue current management and monitor for adverse reaction and disease progression.  Follow-up as noted below  - Lipid Panel; Future    6. Nicotine dependence with current use  Uncontrolled. Discussed cessation aids and benefits of cessation as well as risks of continuing for greater than 3 minutes.    7. B12 deficiency  - Vitamin B12; Future  - Folate; Future       I have spent 30 minutes on chart review, care coordination and patient counseling regarding disease state, lifestyle modifications and/or health

## 2025-03-28 DIAGNOSIS — E78.2 MIXED HYPERLIPIDEMIA: ICD-10-CM

## 2025-03-31 RX ORDER — ROSUVASTATIN CALCIUM 40 MG/1
40 TABLET, COATED ORAL DAILY
Qty: 90 TABLET | Refills: 0 | Status: SHIPPED | OUTPATIENT
Start: 2025-03-31

## 2025-04-06 ENCOUNTER — PATIENT MESSAGE (OUTPATIENT)
Facility: CLINIC | Age: 65
End: 2025-04-06

## 2025-04-06 DIAGNOSIS — N32.81 OVERACTIVE BLADDER: Primary | ICD-10-CM

## 2025-04-07 RX ORDER — VIBEGRON 75 MG/1
75 TABLET, FILM COATED ORAL DAILY
Qty: 90 TABLET | Refills: 3 | Status: SHIPPED | OUTPATIENT
Start: 2025-04-07

## 2025-04-24 ENCOUNTER — PATIENT MESSAGE (OUTPATIENT)
Facility: CLINIC | Age: 65
End: 2025-04-24

## 2025-04-30 NOTE — TELEPHONE ENCOUNTER
As of 4/30/2025 6431 we have not received any additional correspondence from the patient's insurance provider.

## 2025-06-09 DIAGNOSIS — R42 VERTIGO: ICD-10-CM

## 2025-06-09 RX ORDER — MECLIZINE HYDROCHLORIDE 25 MG/1
25 TABLET ORAL 3 TIMES DAILY PRN
Qty: 270 TABLET | Refills: 0 | Status: CANCELLED | OUTPATIENT
Start: 2025-06-09

## 2025-06-10 DIAGNOSIS — R42 VERTIGO: ICD-10-CM

## 2025-06-10 NOTE — TELEPHONE ENCOUNTER
This patient contacted office for the following prescriptions to be filled:    Medication requested :  meclizine (ANTIVERT)   PCP: [email or web address]   Pharmacy or Print: [email or web address]  Mail order or Local pharmacy [facility]  Last office visit 3/12/25  Next office visit 7/14/25

## 2025-06-11 RX ORDER — MECLIZINE HYDROCHLORIDE 25 MG/1
25 TABLET ORAL 3 TIMES DAILY PRN
Qty: 270 TABLET | Refills: 0 | Status: SHIPPED | OUTPATIENT
Start: 2025-06-11

## 2025-06-25 DIAGNOSIS — E78.2 MIXED HYPERLIPIDEMIA: ICD-10-CM

## 2025-06-26 RX ORDER — ROSUVASTATIN CALCIUM 40 MG/1
40 TABLET, COATED ORAL DAILY
Qty: 90 TABLET | Refills: 0 | OUTPATIENT
Start: 2025-06-26

## 2025-07-08 ENCOUNTER — HOSPITAL ENCOUNTER (OUTPATIENT)
Age: 65
Discharge: HOME | End: 2025-07-08
Payer: MEDICARE

## 2025-07-08 DIAGNOSIS — E53.8 B12 DEFICIENCY: ICD-10-CM

## 2025-07-08 DIAGNOSIS — E78.2 MIXED HYPERLIPIDEMIA: ICD-10-CM

## 2025-07-08 DIAGNOSIS — I10 PRIMARY HYPERTENSION: Chronic | ICD-10-CM

## 2025-07-08 LAB
ALBUMIN SERPL-MCNC: 3.6 G/DL (ref 3.4–5)
ALBUMIN/GLOB SERPL: 1.2 (ref 0.8–1.7)
ALP SERPL-CCNC: 98 U/L (ref 45–117)
ALT SERPL-CCNC: 38 U/L (ref 10–35)
ANION GAP SERPL CALC-SCNC: 11 MMOL/L (ref 3–18)
AST SERPL-CCNC: 42 U/L (ref 10–38)
BASOPHILS # BLD: 0.04 K/UL (ref 0–0.1)
BASOPHILS NFR BLD: 0.4 % (ref 0–2)
BILIRUB SERPL-MCNC: 0.4 MG/DL (ref 0.2–1)
BUN SERPL-MCNC: 11 MG/DL (ref 6–23)
BUN/CREAT SERPL: 14 (ref 12–20)
CALCIUM SERPL-MCNC: 10.1 MG/DL (ref 8.5–10.1)
CHLORIDE SERPL-SCNC: 104 MMOL/L (ref 98–107)
CHOLEST SERPL-MCNC: 135 MG/DL
CO2 SERPL-SCNC: 26 MMOL/L (ref 21–32)
CREAT SERPL-MCNC: 0.76 MG/DL (ref 0.6–1.3)
DIFFERENTIAL METHOD BLD: ABNORMAL
EOSINOPHIL # BLD: 0.11 K/UL (ref 0–0.4)
EOSINOPHIL NFR BLD: 1.2 % (ref 0–5)
ERYTHROCYTE [DISTWIDTH] IN BLOOD BY AUTOMATED COUNT: 12.2 % (ref 11.6–14.5)
FOLATE SERPL-MCNC: >20 NG/ML (ref 4.6–34.8)
GLOBULIN SER CALC-MCNC: 3.1 G/DL (ref 2–4)
GLUCOSE SERPL-MCNC: 95 MG/DL (ref 74–108)
HCT VFR BLD AUTO: 45.7 % (ref 35–45)
HDLC SERPL-MCNC: 51 MG/DL (ref 40–60)
HDLC SERPL: 2.6 (ref 0–5)
HGB BLD-MCNC: 14.6 G/DL (ref 12–16)
IMM GRANULOCYTES # BLD AUTO: 0.02 K/UL (ref 0–0.04)
IMM GRANULOCYTES NFR BLD AUTO: 0.2 % (ref 0–0.5)
LDLC SERPL CALC-MCNC: 69 MG/DL (ref 0–100)
LYMPHOCYTES # BLD: 3.35 K/UL (ref 0.9–3.6)
LYMPHOCYTES NFR BLD: 35.2 % (ref 21–52)
MCH RBC QN AUTO: 30.6 PG (ref 24–34)
MCHC RBC AUTO-ENTMCNC: 31.9 G/DL (ref 31–37)
MCV RBC AUTO: 95.8 FL (ref 78–100)
MONOCYTES # BLD: 0.54 K/UL (ref 0.05–1.2)
MONOCYTES NFR BLD: 5.7 % (ref 3–10)
NEUTS SEG # BLD: 5.45 K/UL (ref 1.8–8)
NEUTS SEG NFR BLD: 57.3 % (ref 40–73)
NRBC # BLD: 0 K/UL (ref 0–0.01)
NRBC BLD-RTO: 0 PER 100 WBC
PLATELET # BLD AUTO: 343 K/UL (ref 135–420)
PMV BLD AUTO: 8.9 FL (ref 9.2–11.8)
POTASSIUM SERPL-SCNC: 4.8 MMOL/L (ref 3.5–5.5)
PROT SERPL-MCNC: 6.6 G/DL (ref 6.4–8.2)
RBC # BLD AUTO: 4.77 M/UL (ref 4.2–5.3)
SODIUM SERPL-SCNC: 141 MMOL/L (ref 136–145)
TRIGL SERPL-MCNC: 75 MG/DL (ref 0–150)
VIT B12 SERPL-MCNC: >2000 PG/ML (ref 211–911)
VLDLC SERPL CALC-MCNC: 15 MG/DL
WBC # BLD AUTO: 9.5 K/UL (ref 4.6–13.2)

## 2025-07-08 PROCEDURE — 82607 VITAMIN B-12: CPT

## 2025-07-08 PROCEDURE — 82746 ASSAY OF FOLIC ACID SERUM: CPT

## 2025-07-08 PROCEDURE — 85025 COMPLETE CBC W/AUTO DIFF WBC: CPT

## 2025-07-08 PROCEDURE — 36415 COLL VENOUS BLD VENIPUNCTURE: CPT

## 2025-07-08 PROCEDURE — 80053 COMPREHEN METABOLIC PANEL: CPT

## 2025-07-08 PROCEDURE — 80061 LIPID PANEL: CPT

## 2025-07-11 SDOH — ECONOMIC STABILITY: FOOD INSECURITY: WITHIN THE PAST 12 MONTHS, THE FOOD YOU BOUGHT JUST DIDN'T LAST AND YOU DIDN'T HAVE MONEY TO GET MORE.: NEVER TRUE

## 2025-07-11 SDOH — ECONOMIC STABILITY: INCOME INSECURITY: IN THE LAST 12 MONTHS, WAS THERE A TIME WHEN YOU WERE NOT ABLE TO PAY THE MORTGAGE OR RENT ON TIME?: NO

## 2025-07-11 SDOH — ECONOMIC STABILITY: FOOD INSECURITY: WITHIN THE PAST 12 MONTHS, YOU WORRIED THAT YOUR FOOD WOULD RUN OUT BEFORE YOU GOT MONEY TO BUY MORE.: NEVER TRUE

## 2025-07-11 SDOH — ECONOMIC STABILITY: TRANSPORTATION INSECURITY
IN THE PAST 12 MONTHS, HAS THE LACK OF TRANSPORTATION KEPT YOU FROM MEDICAL APPOINTMENTS OR FROM GETTING MEDICATIONS?: NO

## 2025-07-14 ENCOUNTER — OFFICE VISIT (OUTPATIENT)
Facility: CLINIC | Age: 65
End: 2025-07-14

## 2025-07-14 VITALS
BODY MASS INDEX: 39.05 KG/M2 | HEIGHT: 66 IN | HEART RATE: 88 BPM | WEIGHT: 243 LBS | RESPIRATION RATE: 18 BRPM | DIASTOLIC BLOOD PRESSURE: 72 MMHG | SYSTOLIC BLOOD PRESSURE: 128 MMHG | OXYGEN SATURATION: 96 % | TEMPERATURE: 98.2 F

## 2025-07-14 DIAGNOSIS — E66.01 CLASS 2 SEVERE OBESITY DUE TO EXCESS CALORIES WITH SERIOUS COMORBIDITY AND BODY MASS INDEX (BMI) OF 39.0 TO 39.9 IN ADULT (HCC): Primary | ICD-10-CM

## 2025-07-14 DIAGNOSIS — Z28.21 PNEUMOCOCCAL VACCINATION DECLINED: ICD-10-CM

## 2025-07-14 DIAGNOSIS — R79.89 ELEVATED VITAMIN B12 LEVEL: ICD-10-CM

## 2025-07-14 DIAGNOSIS — E66.812 CLASS 2 SEVERE OBESITY DUE TO EXCESS CALORIES WITH SERIOUS COMORBIDITY AND BODY MASS INDEX (BMI) OF 39.0 TO 39.9 IN ADULT (HCC): Primary | ICD-10-CM

## 2025-07-14 DIAGNOSIS — R42 VERTIGO: ICD-10-CM

## 2025-07-14 DIAGNOSIS — E78.2 MIXED HYPERLIPIDEMIA: ICD-10-CM

## 2025-07-14 DIAGNOSIS — I10 PRIMARY HYPERTENSION: ICD-10-CM

## 2025-07-14 DIAGNOSIS — F17.200 NICOTINE DEPENDENCE WITH CURRENT USE: ICD-10-CM

## 2025-07-14 DIAGNOSIS — N32.81 OVERACTIVE BLADDER: ICD-10-CM

## 2025-07-14 PROBLEM — M47.817 LUMBOSACRAL SPONDYLOSIS WITHOUT MYELOPATHY: Status: ACTIVE | Noted: 2025-07-14

## 2025-07-14 RX ORDER — METAXALONE 800 MG/1
TABLET ORAL
COMMUNITY
Start: 2025-06-03

## 2025-07-14 RX ORDER — ROSUVASTATIN CALCIUM 40 MG/1
40 TABLET, COATED ORAL DAILY
Qty: 90 TABLET | Refills: 3 | Status: SHIPPED | OUTPATIENT
Start: 2025-07-14

## 2025-07-14 ASSESSMENT — ENCOUNTER SYMPTOMS
COUGH: 0
SHORTNESS OF BREATH: 0
CONSTIPATION: 0
BACK PAIN: 1
ABDOMINAL PAIN: 0
DIARRHEA: 0
EYE PAIN: 0

## 2025-07-14 ASSESSMENT — PATIENT HEALTH QUESTIONNAIRE - PHQ9
SUM OF ALL RESPONSES TO PHQ QUESTIONS 1-9: 1
1. LITTLE INTEREST OR PLEASURE IN DOING THINGS: NOT AT ALL
2. FEELING DOWN, DEPRESSED OR HOPELESS: SEVERAL DAYS
SUM OF ALL RESPONSES TO PHQ QUESTIONS 1-9: 1

## 2025-07-14 NOTE — PATIENT INSTRUCTIONS
Patient Education        A Healthy Lifestyle: Care Instructions  A healthy lifestyle can help you feel good, have more energy, and stay at a weight that's healthy for you. You can share a healthy lifestyle with your friends and family. And you can do it on your own.    Eat meals with your friends or family. You could try cooking together.   Plan activities with other people. Go for a walk with a friend, try a free online fitness class, or join a sports league.     Eat a variety of healthy foods. These include fruits, vegetables, whole grains, low-fat dairy, and lean protein.   Choose healthy portions of food. You can use the Nutrition Facts label on food packages as a guide.     Eat more fruits and vegetables. You could add vegetables to sandwiches or add fruit to cereal.   Drink water when you are thirsty. Limit soda, juice, and sports drinks.     Try to exercise most days. Aim for at least 2½ hours of exercise each week.   Keep moving. Work in the garden or take your dog on a walk. Use the stairs instead of the elevator.     If you use tobacco or nicotine, try to quit. Ask your doctor about programs and medicines to help you quit.   Limit alcohol. Men should have no more than 2 drinks a day. Women should have no more than 1. For some people, no alcohol is the best choice.   Follow-up care is a key part of your treatment and safety. Be sure to make and go to all appointments, and call your doctor if you are having problems. It's also a good idea to know your test results and keep a list of the medicines you take.  Where can you learn more?  Go to https://www.[employer].net/patientEd and enter U807 to learn more about \"A Healthy Lifestyle: Care Instructions.\"  Current as of: April 30, 2024  Content Version: 14.5  © 2867-3509 [employer] [employer].   Care instructions adapted under license by [employer]. If you have questions about a medical condition or this instruction, always ask your healthcare professional.

## 2025-07-14 NOTE — PROGRESS NOTES
[facility]  2025    Keila Romero (: 1960) is a 65 y.o. female, here for the following medical concerns:    Chief Complaint   Patient presents with    Hypertension    Obesity    Cholesterol Problem    Cough     Improving     Nasal Congestion     Improving         ASSESSMENT/ PLAN  1. Class 2 severe obesity due to excess calories with serious comorbidity and body mass index (BMI) of 39.0 to 39.9 in adult (HCC)  Complicating all aspects of patient care. Discussed healthy lifestyle modifications to implement.   - Lipid Panel; Future  - Hemoglobin A1C; Future    2. Primary hypertension  Controlled: Appears stable.  We will continue current management and monitor for adverse reaction and disease progression.  Follow-up as noted below  - Comprehensive Metabolic Panel; Future  - CBC with Auto Differential; Future    3. Mixed hyperlipidemia  Controlled: Appears stable.  We will continue current management and monitor for adverse reaction and disease progression.  Follow-up as noted below  - rosuvastatin (CRESTOR) 40 MG tablet; Take 1 tablet by mouth daily  Dispense: 90 tablet; Refill: 3  - Lipid Panel; Future  - Hemoglobin A1C; Future    4. Overactive bladder  Screen for UTI given frequency  - Culture, Urine; Future  - Urinalysis with Microscopic; Future    5. Pneumococcal vaccination declined    6. Vertigo  Controlled: Appears stable.  We will continue current management and monitor for adverse reaction and disease progression.  Follow-up as noted below    7. Nicotine dependence with current use  Discussed cessation aids and benefits of cessation as well as risks of continuing for greater than 3 minutes.       I have spent 30 minutes on chart review, care coordination and patient counseling regarding disease state, lifestyle modifications and/or health maintenance screening.      Return in about 6 months (around 2026).    No future appointments.        [facility]  LOV 3/12/2025 established